# Patient Record
Sex: FEMALE | Race: BLACK OR AFRICAN AMERICAN | NOT HISPANIC OR LATINO | URBAN - METROPOLITAN AREA
[De-identification: names, ages, dates, MRNs, and addresses within clinical notes are randomized per-mention and may not be internally consistent; named-entity substitution may affect disease eponyms.]

---

## 2017-09-08 ENCOUNTER — EMERGENCY (EMERGENCY)
Facility: HOSPITAL | Age: 22
LOS: 1 days | Discharge: HOME | End: 2017-09-08
Attending: EMERGENCY MEDICINE | Admitting: EMERGENCY MEDICINE
Payer: OTHER MISCELLANEOUS

## 2017-09-08 VITALS
TEMPERATURE: 98 F | DIASTOLIC BLOOD PRESSURE: 80 MMHG | SYSTOLIC BLOOD PRESSURE: 130 MMHG | OXYGEN SATURATION: 99 % | RESPIRATION RATE: 16 BRPM | HEART RATE: 62 BPM

## 2017-09-08 PROBLEM — Z00.00 ENCOUNTER FOR PREVENTIVE HEALTH EXAMINATION: Status: ACTIVE | Noted: 2017-09-08

## 2017-09-08 PROCEDURE — 99284 EMERGENCY DEPT VISIT MOD MDM: CPT
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
## 2017-09-09 NOTE — ED PROVIDER NOTE - MUSCULOSKELETAL, MLM
Spine appears normal, range of motion is not limited, no muscle or joint tenderness. ttp at lower thoracic paraspinal not midline
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
## 2018-12-12 ENCOUNTER — RESULT REVIEW (OUTPATIENT)
Age: 23
End: 2018-12-12

## 2019-04-01 ENCOUNTER — EMERGENCY (EMERGENCY)
Facility: HOSPITAL | Age: 24
LOS: 1 days | Discharge: HOME | End: 2019-04-01
Admitting: EMERGENCY MEDICINE
Payer: COMMERCIAL

## 2019-04-01 VITALS
HEART RATE: 85 BPM | TEMPERATURE: 98 F | OXYGEN SATURATION: 98 % | RESPIRATION RATE: 18 BRPM | DIASTOLIC BLOOD PRESSURE: 85 MMHG | SYSTOLIC BLOOD PRESSURE: 149 MMHG

## 2019-04-01 DIAGNOSIS — Z90.5 ACQUIRED ABSENCE OF KIDNEY: Chronic | ICD-10-CM

## 2019-04-01 PROCEDURE — 99283 EMERGENCY DEPT VISIT LOW MDM: CPT

## 2019-04-01 RX ORDER — METHOCARBAMOL 500 MG/1
2 TABLET, FILM COATED ORAL
Qty: 18 | Refills: 0 | OUTPATIENT
Start: 2019-04-01 | End: 2019-04-03

## 2019-04-01 RX ORDER — ACETAMINOPHEN 500 MG
975 TABLET ORAL ONCE
Qty: 0 | Refills: 0 | Status: DISCONTINUED | OUTPATIENT
Start: 2019-04-01 | End: 2019-04-05

## 2019-04-01 NOTE — ED PROVIDER NOTE - NSFOLLOWUPINSTRUCTIONS_ED_ALL_ED_FT
Take tylenol 650mg every 6-8 hours as needed for pain.  take methocarbamol 1000mg every 8 hours as needed.  avoid any strenuous activity;   heat packs to affected area;   Drink plenty of fluids; rest;   Follow up with your pmd in 2 days.   return to ED for any worsening symptoms.

## 2019-04-01 NOTE — ED PROVIDER NOTE - PHYSICAL EXAMINATION
MSK: back: paraspinal muscle tenderness in thoracic and lumbar region, no midline tenderness, sensation intact, muscle strength 5/5 in Upper and lower extremities  NVI
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
## 2019-04-11 ENCOUNTER — EMERGENCY (EMERGENCY)
Facility: HOSPITAL | Age: 24
LOS: 1 days | Discharge: HOME | End: 2019-04-11
Attending: EMERGENCY MEDICINE | Admitting: EMERGENCY MEDICINE
Payer: COMMERCIAL

## 2019-04-11 VITALS
OXYGEN SATURATION: 99 % | DIASTOLIC BLOOD PRESSURE: 81 MMHG | RESPIRATION RATE: 18 BRPM | TEMPERATURE: 98 F | HEART RATE: 158 BPM | SYSTOLIC BLOOD PRESSURE: 116 MMHG

## 2019-04-11 VITALS — HEART RATE: 78 BPM

## 2019-04-11 DIAGNOSIS — Z90.5 ACQUIRED ABSENCE OF KIDNEY: Chronic | ICD-10-CM

## 2019-04-11 PROBLEM — Z78.9 OTHER SPECIFIED HEALTH STATUS: Chronic | Status: ACTIVE | Noted: 2019-04-01

## 2019-04-11 PROCEDURE — 72110 X-RAY EXAM L-2 SPINE 4/>VWS: CPT | Mod: 26

## 2019-04-11 PROCEDURE — 71046 X-RAY EXAM CHEST 2 VIEWS: CPT | Mod: 26

## 2019-04-11 PROCEDURE — 99283 EMERGENCY DEPT VISIT LOW MDM: CPT

## 2019-04-11 RX ORDER — LIDOCAINE 4 G/100G
1 CREAM TOPICAL ONCE
Qty: 0 | Refills: 0 | Status: COMPLETED | OUTPATIENT
Start: 2019-04-11 | End: 2019-04-11

## 2019-04-11 RX ORDER — OXYCODONE HYDROCHLORIDE 5 MG/1
5 TABLET ORAL ONCE
Qty: 0 | Refills: 0 | Status: DISCONTINUED | OUTPATIENT
Start: 2019-04-11 | End: 2019-04-11

## 2019-04-11 RX ORDER — ACETAMINOPHEN 500 MG
650 TABLET ORAL ONCE
Qty: 0 | Refills: 0 | Status: COMPLETED | OUTPATIENT
Start: 2019-04-11 | End: 2019-04-11

## 2019-04-11 RX ORDER — CYCLOBENZAPRINE HYDROCHLORIDE 10 MG/1
5 TABLET, FILM COATED ORAL ONCE
Qty: 0 | Refills: 0 | Status: DISCONTINUED | OUTPATIENT
Start: 2019-04-11 | End: 2019-04-11

## 2019-04-11 RX ORDER — CYCLOBENZAPRINE HYDROCHLORIDE 10 MG/1
10 TABLET, FILM COATED ORAL ONCE
Qty: 0 | Refills: 0 | Status: COMPLETED | OUTPATIENT
Start: 2019-04-11 | End: 2019-04-11

## 2019-04-11 RX ADMIN — Medication 650 MILLIGRAM(S): at 09:26

## 2019-04-11 RX ADMIN — Medication 650 MILLIGRAM(S): at 11:19

## 2019-04-11 RX ADMIN — OXYCODONE HYDROCHLORIDE 5 MILLIGRAM(S): 5 TABLET ORAL at 11:35

## 2019-04-11 RX ADMIN — LIDOCAINE 1 PATCH: 4 CREAM TOPICAL at 09:26

## 2019-04-11 RX ADMIN — CYCLOBENZAPRINE HYDROCHLORIDE 10 MILLIGRAM(S): 10 TABLET, FILM COATED ORAL at 09:26

## 2019-04-11 NOTE — ED PROVIDER NOTE - CHPI ED SYMPTOMS NEG
No nausea, no vomiting no bowel/bladder incontinence, no numbness/weakness in legs, no chest pain, no headache, no abdominal pain, no trouble breathing, no diarrhea

## 2019-04-11 NOTE — ED PROVIDER NOTE - MUSCULOSKELETAL, MLM
+FROM c-spine, no spinal tenderness. No midline back tenderness, no paraspinal tenderness. Neurologically intact. Strength and sensation intact. No pronator drift.

## 2019-04-11 NOTE — ED PROVIDER NOTE - PROGRESS NOTE DETAILS
Núñez: patient states pain slightly removed, discussed with patient xray results and need for possible MRI outpatient, agrees, sent meds to pharmacy, return precautions provided
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
## 2019-04-11 NOTE — ED PROVIDER NOTE - NSFOLLOWUPINSTRUCTIONS_ED_ALL_ED_FT
You were seen for back pain. At this time it does not appear you have a fracture on your xrays. Please follow up with the spine specialists provided. Return to the ED if you have new or worsening pain, weakness, numbness, difficulty urinating, lose control of your bowels, or have other new symptoms. Take the medications as provided
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
## 2019-04-11 NOTE — ED PROVIDER NOTE - NSFOLLOWUPCLINICS_GEN_ALL_ED_FT
[facility]  Ortho/Spine  [address] [place] [address]  Phone: [phone]  Fax:   Follow Up Time: 1-3 Days    [facility]  Orthopedic Surgery  [address], 3rd & 4th [place] [address]  Phone: [phone]  Fax:   Follow Up Time: 1-3 Days

## 2020-03-01 ENCOUNTER — EMERGENCY (EMERGENCY)
Facility: HOSPITAL | Age: 25
LOS: 1 days | Discharge: HOME | End: 2020-03-01
Attending: EMERGENCY MEDICINE | Admitting: EMERGENCY MEDICINE
Payer: COMMERCIAL

## 2020-03-01 VITALS
OXYGEN SATURATION: 100 % | SYSTOLIC BLOOD PRESSURE: 130 MMHG | RESPIRATION RATE: 18 BRPM | TEMPERATURE: 99 F | DIASTOLIC BLOOD PRESSURE: 84 MMHG | HEART RATE: 77 BPM

## 2020-03-01 VITALS
DIASTOLIC BLOOD PRESSURE: 85 MMHG | RESPIRATION RATE: 16 BRPM | HEART RATE: 91 BPM | TEMPERATURE: 99 F | SYSTOLIC BLOOD PRESSURE: 131 MMHG | OXYGEN SATURATION: 99 %

## 2020-03-01 DIAGNOSIS — Z90.5 ACQUIRED ABSENCE OF KIDNEY: Chronic | ICD-10-CM

## 2020-03-01 LAB
ALBUMIN SERPL ELPH-MCNC: 4.2 G/DL — SIGNIFICANT CHANGE UP (ref 3.3–5)
ALP SERPL-CCNC: 47 U/L — SIGNIFICANT CHANGE UP (ref 40–120)
ALT FLD-CCNC: 20 U/L — SIGNIFICANT CHANGE UP (ref 4–33)
ANION GAP SERPL CALC-SCNC: 13 MMO/L — SIGNIFICANT CHANGE UP (ref 7–14)
ANISOCYTOSIS BLD QL: SLIGHT — SIGNIFICANT CHANGE UP
AST SERPL-CCNC: 17 U/L — SIGNIFICANT CHANGE UP (ref 4–32)
BASE EXCESS BLDV CALC-SCNC: 2.1 MMOL/L — SIGNIFICANT CHANGE UP
BASOPHILS # BLD AUTO: 0.03 K/UL — SIGNIFICANT CHANGE UP (ref 0–0.2)
BASOPHILS NFR BLD AUTO: 0.5 % — SIGNIFICANT CHANGE UP (ref 0–2)
BASOPHILS NFR SPEC: 0 % — SIGNIFICANT CHANGE UP (ref 0–2)
BILIRUB SERPL-MCNC: 0.3 MG/DL — SIGNIFICANT CHANGE UP (ref 0.2–1.2)
BLASTS # FLD: 0 % — SIGNIFICANT CHANGE UP (ref 0–0)
BLOOD GAS VENOUS - CREATININE: 1.11 MG/DL — SIGNIFICANT CHANGE UP (ref 0.5–1.3)
BLOOD GAS VENOUS - FIO2: 21 — SIGNIFICANT CHANGE UP
BUN SERPL-MCNC: 8 MG/DL — SIGNIFICANT CHANGE UP (ref 7–23)
CALCIUM SERPL-MCNC: 9.1 MG/DL — SIGNIFICANT CHANGE UP (ref 8.4–10.5)
CHLORIDE BLDV-SCNC: 105 MMOL/L — SIGNIFICANT CHANGE UP (ref 96–108)
CHLORIDE SERPL-SCNC: 100 MMOL/L — SIGNIFICANT CHANGE UP (ref 98–107)
CO2 SERPL-SCNC: 24 MMOL/L — SIGNIFICANT CHANGE UP (ref 22–31)
CREAT SERPL-MCNC: 1.07 MG/DL — SIGNIFICANT CHANGE UP (ref 0.5–1.3)
EOSINOPHIL # BLD AUTO: 0.18 K/UL — SIGNIFICANT CHANGE UP (ref 0–0.5)
EOSINOPHIL NFR BLD AUTO: 3.3 % — SIGNIFICANT CHANGE UP (ref 0–6)
EOSINOPHIL NFR FLD: 0 % — SIGNIFICANT CHANGE UP (ref 0–6)
GAS PNL BLDV: 135 MMOL/L — LOW (ref 136–146)
GLUCOSE BLDV-MCNC: 78 MG/DL — SIGNIFICANT CHANGE UP (ref 70–99)
GLUCOSE SERPL-MCNC: 83 MG/DL — SIGNIFICANT CHANGE UP (ref 70–99)
HCO3 BLDV-SCNC: 24 MMOL/L — SIGNIFICANT CHANGE UP (ref 20–27)
HCT VFR BLD CALC: 41.2 % — SIGNIFICANT CHANGE UP (ref 34.5–45)
HCT VFR BLDV CALC: 41.9 % — SIGNIFICANT CHANGE UP (ref 34.5–45)
HGB BLD-MCNC: 13.4 G/DL — SIGNIFICANT CHANGE UP (ref 11.5–15.5)
HGB BLDV-MCNC: 13.6 G/DL — SIGNIFICANT CHANGE UP (ref 11.5–15.5)
IMM GRANULOCYTES NFR BLD AUTO: 0.2 % — SIGNIFICANT CHANGE UP (ref 0–1.5)
LACTATE BLDV-MCNC: 1.9 MMOL/L — SIGNIFICANT CHANGE UP (ref 0.5–2)
LYMPHOCYTES # BLD AUTO: 1.06 K/UL — SIGNIFICANT CHANGE UP (ref 1–3.3)
LYMPHOCYTES # BLD AUTO: 19.2 % — SIGNIFICANT CHANGE UP (ref 13–44)
LYMPHOCYTES NFR SPEC AUTO: 23.4 % — SIGNIFICANT CHANGE UP (ref 13–44)
MCHC RBC-ENTMCNC: 28.5 PG — SIGNIFICANT CHANGE UP (ref 27–34)
MCHC RBC-ENTMCNC: 32.5 % — SIGNIFICANT CHANGE UP (ref 32–36)
MCV RBC AUTO: 87.7 FL — SIGNIFICANT CHANGE UP (ref 80–100)
METAMYELOCYTES # FLD: 0 % — SIGNIFICANT CHANGE UP (ref 0–1)
MONOCYTES # BLD AUTO: 1.56 K/UL — HIGH (ref 0–0.9)
MONOCYTES NFR BLD AUTO: 28.3 % — HIGH (ref 2–14)
MONOCYTES NFR BLD: 27 % — HIGH (ref 2–9)
MYELOCYTES NFR BLD: 0 % — SIGNIFICANT CHANGE UP (ref 0–0)
NEUTROPHIL AB SER-ACNC: 42.4 % — LOW (ref 43–77)
NEUTROPHILS # BLD AUTO: 2.68 K/UL — SIGNIFICANT CHANGE UP (ref 1.8–7.4)
NEUTROPHILS NFR BLD AUTO: 48.5 % — SIGNIFICANT CHANGE UP (ref 43–77)
NEUTS BAND # BLD: 5.4 % — SIGNIFICANT CHANGE UP (ref 0–6)
NRBC # FLD: 0 K/UL — SIGNIFICANT CHANGE UP (ref 0–0)
OTHER - HEMATOLOGY %: 0 — SIGNIFICANT CHANGE UP
PCO2 BLDV: 54 MMHG — HIGH (ref 41–51)
PH BLDV: 7.33 PH — SIGNIFICANT CHANGE UP (ref 7.32–7.43)
PLATELET # BLD AUTO: 217 K/UL — SIGNIFICANT CHANGE UP (ref 150–400)
PLATELET COUNT - ESTIMATE: NORMAL — SIGNIFICANT CHANGE UP
PMV BLD: 11.1 FL — SIGNIFICANT CHANGE UP (ref 7–13)
PO2 BLDV: < 24 MMHG — LOW (ref 35–40)
POTASSIUM BLDV-SCNC: 3.5 MMOL/L — SIGNIFICANT CHANGE UP (ref 3.4–4.5)
POTASSIUM SERPL-MCNC: 4 MMOL/L — SIGNIFICANT CHANGE UP (ref 3.5–5.3)
POTASSIUM SERPL-SCNC: 4 MMOL/L — SIGNIFICANT CHANGE UP (ref 3.5–5.3)
PROMYELOCYTES # FLD: 0 % — SIGNIFICANT CHANGE UP (ref 0–0)
PROT SERPL-MCNC: 7.3 G/DL — SIGNIFICANT CHANGE UP (ref 6–8.3)
RBC # BLD: 4.7 M/UL — SIGNIFICANT CHANGE UP (ref 3.8–5.2)
RBC # FLD: 13.1 % — SIGNIFICANT CHANGE UP (ref 10.3–14.5)
SAO2 % BLDV: 33.6 % — LOW (ref 60–85)
SODIUM SERPL-SCNC: 137 MMOL/L — SIGNIFICANT CHANGE UP (ref 135–145)
VARIANT LYMPHS # BLD: 1.8 % — SIGNIFICANT CHANGE UP
WBC # BLD: 5.52 K/UL — SIGNIFICANT CHANGE UP (ref 3.8–10.5)
WBC # FLD AUTO: 5.52 K/UL — SIGNIFICANT CHANGE UP (ref 3.8–10.5)

## 2020-03-01 PROCEDURE — 99283 EMERGENCY DEPT VISIT LOW MDM: CPT

## 2020-03-01 PROCEDURE — 71046 X-RAY EXAM CHEST 2 VIEWS: CPT | Mod: 26

## 2020-03-01 RX ORDER — SODIUM CHLORIDE 9 MG/ML
1000 INJECTION INTRAMUSCULAR; INTRAVENOUS; SUBCUTANEOUS ONCE
Refills: 0 | Status: COMPLETED | OUTPATIENT
Start: 2020-03-01 | End: 2020-03-01

## 2020-03-01 RX ORDER — ACETAMINOPHEN 500 MG
650 TABLET ORAL ONCE
Refills: 0 | Status: COMPLETED | OUTPATIENT
Start: 2020-03-01 | End: 2020-03-01

## 2020-03-01 RX ADMIN — SODIUM CHLORIDE 1000 MILLILITER(S): 9 INJECTION INTRAMUSCULAR; INTRAVENOUS; SUBCUTANEOUS at 10:58

## 2020-03-01 RX ADMIN — Medication 650 MILLIGRAM(S): at 10:57

## 2020-03-01 RX ADMIN — SODIUM CHLORIDE 1000 MILLILITER(S): 9 INJECTION INTRAMUSCULAR; INTRAVENOUS; SUBCUTANEOUS at 12:22

## 2020-03-01 NOTE — ED PROVIDER NOTE - OBJECTIVE STATEMENT
26 y/o female presents to the ED c/o fever, chills, and body ache x3 days and cough since last night. She states Friday morning she woke up with a fever (T=103.5), headache and body aches. States the headache resolved but the fever and body aches persisted. Reports before her cough she had a few episodes of vomiting, but not currently. Reports recent travel to [place]. Denies getting a flu shot this season. Denies smoking and admits to drinking in moderation.  States she donated a kidney to her father.

## 2020-03-01 NOTE — ED PROVIDER NOTE - CLINICAL SUMMARY MEDICAL DECISION MAKING FREE TEXT BOX
26 y/o female with most likely viral syndrome, possible UTI, fever, cough and body aches. Will rule out pneumonia but most likely viral. Will check basic labs, chest x-ray and reassess. 24 y/o female with most likely viral syndrome, possible UTI, fever, cough and body aches. Will rule out pneumonia but most likely viral. Will check basic labs, chest x-ray and reassess.    Pt with URI symptoms, flu like symptoms, and cough, lungs cta, heart wnl, possibly viral syndrome, but concern for pneumonia, dehydration or electrolyte disturbance,  will check labs, IVF, check xray, and reassess.

## 2020-03-01 NOTE — ED PROVIDER NOTE - NSFOLLOWUPINSTRUCTIONS_ED_ALL_ED_FT
You were seen in the emergency department for cough and body aches. Please follow up with your primary doctor in the next 2-3 days. Take ibuprofen 400 milligrams every 4 hours as needed for pain and or fever 100.4 or higher; if fever persists between doses take tylenol 650 milligrams every 6 hours as needed. Return to the emergency department immediately if you experience difficulty breathing or any other concerning symptoms.

## 2020-03-01 NOTE — ED PROVIDER NOTE - ATTENDING CONTRIBUTION TO CARE
I performed a face to face evaluation of this patient and performed a full history and physical examination on the patient.  I agree with the resident's history, physical examination, and plan of the patient.  Pt with URI symptoms, flu like symptoms, and cough, lungs cta, heart wnl, possibly viral syndrome, but concern for pneumonia, dehydration or electrolyte disturbance,  will check labs, IVF, check xray, and reassess.

## 2020-03-01 NOTE — ED PROVIDER NOTE - ENMT, MLM
Airway patent, MMM. Throat has no vesicles, no oropharyngeal exudates and uvula is midline. Neck is supple.

## 2022-11-30 ENCOUNTER — EMERGENCY (EMERGENCY)
Facility: HOSPITAL | Age: 27
LOS: 1 days | Discharge: HOME | End: 2022-11-30
Admitting: EMERGENCY MEDICINE

## 2022-11-30 VITALS
SYSTOLIC BLOOD PRESSURE: 133 MMHG | HEART RATE: 105 BPM | DIASTOLIC BLOOD PRESSURE: 85 MMHG | RESPIRATION RATE: 18 BRPM | TEMPERATURE: 100 F | OXYGEN SATURATION: 92 %

## 2022-11-30 VITALS
TEMPERATURE: 101 F | DIASTOLIC BLOOD PRESSURE: 87 MMHG | OXYGEN SATURATION: 97 % | HEART RATE: 105 BPM | RESPIRATION RATE: 16 BRPM | SYSTOLIC BLOOD PRESSURE: 140 MMHG

## 2022-11-30 DIAGNOSIS — Z90.5 ACQUIRED ABSENCE OF KIDNEY: Chronic | ICD-10-CM

## 2022-11-30 LAB
ALBUMIN SERPL ELPH-MCNC: 4.4 G/DL — SIGNIFICANT CHANGE UP (ref 3.3–5)
ALP SERPL-CCNC: 56 U/L — SIGNIFICANT CHANGE UP (ref 40–120)
ALT FLD-CCNC: 12 U/L — SIGNIFICANT CHANGE UP (ref 4–33)
ANION GAP SERPL CALC-SCNC: 13 MMOL/L — SIGNIFICANT CHANGE UP (ref 7–14)
AST SERPL-CCNC: 14 U/L — SIGNIFICANT CHANGE UP (ref 4–32)
BASOPHILS # BLD AUTO: 0.04 K/UL — SIGNIFICANT CHANGE UP (ref 0–0.2)
BASOPHILS NFR BLD AUTO: 0.6 % — SIGNIFICANT CHANGE UP (ref 0–2)
BILIRUB SERPL-MCNC: 0.6 MG/DL — SIGNIFICANT CHANGE UP (ref 0.2–1.2)
BUN SERPL-MCNC: 10 MG/DL — SIGNIFICANT CHANGE UP (ref 7–23)
CALCIUM SERPL-MCNC: 9.4 MG/DL — SIGNIFICANT CHANGE UP (ref 8.4–10.5)
CHLORIDE SERPL-SCNC: 104 MMOL/L — SIGNIFICANT CHANGE UP (ref 98–107)
CO2 SERPL-SCNC: 22 MMOL/L — SIGNIFICANT CHANGE UP (ref 22–31)
CREAT SERPL-MCNC: 1.09 MG/DL — SIGNIFICANT CHANGE UP (ref 0.5–1.3)
EGFR: 71 ML/MIN/1.73M2 — SIGNIFICANT CHANGE UP
EOSINOPHIL # BLD AUTO: 0.26 K/UL — SIGNIFICANT CHANGE UP (ref 0–0.5)
EOSINOPHIL NFR BLD AUTO: 3.7 % — SIGNIFICANT CHANGE UP (ref 0–6)
FLUAV AG NPH QL: DETECTED
FLUBV AG NPH QL: SIGNIFICANT CHANGE UP
GLUCOSE SERPL-MCNC: 88 MG/DL — SIGNIFICANT CHANGE UP (ref 70–99)
HCT VFR BLD CALC: 40.7 % — SIGNIFICANT CHANGE UP (ref 34.5–45)
HGB BLD-MCNC: 13.5 G/DL — SIGNIFICANT CHANGE UP (ref 11.5–15.5)
IANC: 4.1 K/UL — SIGNIFICANT CHANGE UP (ref 1.8–7.4)
IMM GRANULOCYTES NFR BLD AUTO: 0.4 % — SIGNIFICANT CHANGE UP (ref 0–0.9)
LYMPHOCYTES # BLD AUTO: 1.53 K/UL — SIGNIFICANT CHANGE UP (ref 1–3.3)
LYMPHOCYTES # BLD AUTO: 22 % — SIGNIFICANT CHANGE UP (ref 13–44)
MCHC RBC-ENTMCNC: 28.6 PG — SIGNIFICANT CHANGE UP (ref 27–34)
MCHC RBC-ENTMCNC: 33.2 GM/DL — SIGNIFICANT CHANGE UP (ref 32–36)
MCV RBC AUTO: 86.2 FL — SIGNIFICANT CHANGE UP (ref 80–100)
MONOCYTES # BLD AUTO: 0.99 K/UL — HIGH (ref 0–0.9)
MONOCYTES NFR BLD AUTO: 14.2 % — HIGH (ref 2–14)
NEUTROPHILS # BLD AUTO: 4.1 K/UL — SIGNIFICANT CHANGE UP (ref 1.8–7.4)
NEUTROPHILS NFR BLD AUTO: 59.1 % — SIGNIFICANT CHANGE UP (ref 43–77)
NRBC # BLD: 0 /100 WBCS — SIGNIFICANT CHANGE UP (ref 0–0)
NRBC # FLD: 0 K/UL — SIGNIFICANT CHANGE UP (ref 0–0)
PLATELET # BLD AUTO: 263 K/UL — SIGNIFICANT CHANGE UP (ref 150–400)
POTASSIUM SERPL-MCNC: 3.9 MMOL/L — SIGNIFICANT CHANGE UP (ref 3.5–5.3)
POTASSIUM SERPL-SCNC: 3.9 MMOL/L — SIGNIFICANT CHANGE UP (ref 3.5–5.3)
PROT SERPL-MCNC: 7.7 G/DL — SIGNIFICANT CHANGE UP (ref 6–8.3)
RBC # BLD: 4.72 M/UL — SIGNIFICANT CHANGE UP (ref 3.8–5.2)
RBC # FLD: 12.7 % — SIGNIFICANT CHANGE UP (ref 10.3–14.5)
RSV RNA NPH QL NAA+NON-PROBE: SIGNIFICANT CHANGE UP
SARS-COV-2 RNA SPEC QL NAA+PROBE: SIGNIFICANT CHANGE UP
SODIUM SERPL-SCNC: 139 MMOL/L — SIGNIFICANT CHANGE UP (ref 135–145)
WBC # BLD: 6.95 K/UL — SIGNIFICANT CHANGE UP (ref 3.8–10.5)
WBC # FLD AUTO: 6.95 K/UL — SIGNIFICANT CHANGE UP (ref 3.8–10.5)

## 2022-11-30 PROCEDURE — 99284 EMERGENCY DEPT VISIT MOD MDM: CPT

## 2022-11-30 PROCEDURE — 71046 X-RAY EXAM CHEST 2 VIEWS: CPT | Mod: 26

## 2022-11-30 RX ORDER — SODIUM CHLORIDE 9 MG/ML
1000 INJECTION INTRAMUSCULAR; INTRAVENOUS; SUBCUTANEOUS ONCE
Refills: 0 | Status: COMPLETED | OUTPATIENT
Start: 2022-11-30 | End: 2022-11-30

## 2022-11-30 RX ADMIN — Medication 125 MILLIGRAM(S): at 20:47

## 2022-11-30 RX ADMIN — SODIUM CHLORIDE 1000 MILLILITER(S): 9 INJECTION INTRAMUSCULAR; INTRAVENOUS; SUBCUTANEOUS at 20:47

## 2022-11-30 NOTE — ED PROVIDER NOTE - CLINICAL SUMMARY MEDICAL DECISION MAKING FREE TEXT BOX
27-year-old female no past medical history presents the ED complaining of chest tightness, shortness of breath, posttussive vomiting x2 weeks.  likely bronchitis, viral syndrome. r/o pneumonia- cxr, labs, flu/covid swab

## 2022-11-30 NOTE — ED PROVIDER NOTE - PATIENT PORTAL LINK FT
You can access the FollowMyHealth Patient Portal offered by [facility] by registering at the following website: [email or web address]. By joining [employer]’s FollowMyHealth portal, you will also be able to view your health information using other applications (apps) compatible with our system.

## 2022-11-30 NOTE — ED PROVIDER NOTE - CONSTITUTIONAL, MLM
[facility] Certification Request    July 30, 2018      Patient: Olesya Stephen  MR Number: [id number]  YOB: 1948  Date of Visit: 7/30/2018      Dear Radha Swann CNP:    Thank you for this referral.   We are seeing Olesya Stephen for Physical Therapy of neck and back with R leg pain.    Medicare and/or Medicaid requires physician review and approval of the treatment plan. Please review the plan of care and verify that you agree with the therapy plan of care by co-signing this note.      Plan of Care  Authorization / Certification Start Date: 07/30/18  Authorization / Certification End Date: 10/28/18  Authorization / Certification Number of Visits: 12   Communication with: Referral Source  Patient Related Instruction: Nature of Condition;Treatment plan and rationale;Self Care instruction;Basis of treatment;Body mechanics;Posture;Precautions;Next steps;Expected outcome  Times per Week: 1  Number of Weeks: 12  Number of Visits: 12  Discharge Planning: when indicated  Precautions / Restrictions : none  Therapeutic Exercise: ROM;Stretching;Strengthening  Neuromuscular Reeducation: posture;TNE;core;other  Neuromuscular Re-education: neurodynamics  Manual Therapy: soft tissue mobilization;joint mobilization;muscle energy  Functional Training (ADL's): self care;ADL's    Goals:  Pt. will demonstrate/verbalize independence in self-management of condition in : 12 weeks  Pt. will be able to walk : 30 minutes;with less pain;with less difficulty;for household mobility;for community mobility;for exercise/recreation;in 12 weeks  Patient will ascend / descend: stairs;with less pain;with less difficulty;in 12 weeks  Patient will transfer: sit/stand;floor/stand;for car;for toileting;for in/out of bed;for in/out of chair;supine/sit;with less pain;with less difficulty;in 12 weeks  Pt will: be able to perform knitting and quilting with minimal pain and difficullty in 12 weeks for improved QOL.      If  normal... you have any questions or concerns, please don't hesitate to call.    Sincerely,      Abril Celaya, PT, DPT, OCT      Physician recommendation:     _x__ Follow therapist's recommendation        ___ Modify therapy      *Physician co-signature indicates they certify the need for these services furnished within this plan and while under their care.      [facility]   Initial Evaluation    Patient Name: Olesya Stephen  Date of evaluation: 7/30/2018  Visit number: 1/12  Referring Provider: Radha Swann CNP  Referring Diagnosis:   Cervical cord compression with myelopathy (H) [G95.20]  - Primary         Visit Diagnosis:     ICD-10-CM    1. Acute neck pain M54.2    2. Generalized muscle weakness M62.81    3. Chronic low back pain with sciatica M54.40     G89.29    4. Poor balance R26.89    5. Neurologic gait dysfunction R26.9        Assessment:        Olesya Stephen is a 69 y.o. female who presents to therapy today with chief complaints of neck and back pain with UE and LE weakness and decreased sensation. Onset date of sx was ~14 years ago for neck and back pain and hand and foot tingling.  Pt reported h/o decreased energy, use of UE/LE and difficulty with mobility worsening over the past 3-4 years. Pt recently switched her primary MD this past year and was found on a MRI to have cervical and lumbar narrowing. Pt had cervical surgery with laminectomies and laminoplasties on the R side on June 12th, 2018 and pt also had a lumbar injection this past fall/winter.  Functional impairments include difficulty and pain with gardening, walking, quilting, typing/writing, needing a railing for stairs, housework and getting back up from a squatting position.  Pt demo's signs and sx consistent with decreased neck ROM and strength with recent surgery, R UE deltoid and ER weakness, LE proximal and core/trunk weakness, decreased gait and decreased balance.     Pt. is appropriate for skilled PT intervention as  outlined in the Plan of Care (POC).  Pt. is a good candidate for skilled PT services to improve pain levels and function.    Goals:  Pt. will demonstrate/verbalize independence in self-management of condition in : 12 weeks  Pt. will be able to walk : 30 minutes;with less pain;with less difficulty;for household mobility;for community mobility;for exercise/recreation;in 12 weeks  Patient will ascend / descend: stairs;with less pain;with less difficulty;in 12 weeks  Patient will transfer: sit/stand;floor/stand;for car;for toileting;for in/out of bed;for in/out of chair;supine/sit;with less pain;with less difficulty;in 12 weeks  Pt will: be able to perform knitting and quilting with minimal pain and difficullty in 12 weeks for improved QOL.    Patient's expectations/goals are realistic.    Barriers to Learning or Achieving Goals:  No Barriers.       Plan / Patient Instructions:      Plan for next visit: Assess response to daily walking activity.  Attempt shoulder flexion, ER, hip flexion, TA and bridges.  Assess laterality hands and feet and graphesthesia.    Plan of Care:   Authorization / Certification Start Date: 07/30/18  Authorization / Certification End Date: 10/28/18  Authorization / Certification Number of Visits: 12   Communication with: Referral Source  Patient Related Instruction: Nature of Condition;Treatment plan and rationale;Self Care instruction;Basis of treatment;Body mechanics;Posture;Precautions;Next steps;Expected outcome  Times per Week: 1  Number of Weeks: 12  Number of Visits: 12  Discharge Planning: when indicated  Precautions / Restrictions : none  Therapeutic Exercise: ROM;Stretching;Strengthening  Neuromuscular Reeducation: posture;TNE;core;other  Neuromuscular Re-education: neurodynamics  Manual Therapy: soft tissue mobilization;joint mobilization;muscle energy  Functional Training (ADL's): self care;ADL's    Treatment techniques, plan of care, and goals were discussed with the patient.  The  "patient agrees to the plan as outlined.  The plan of care is dynamic and will be modified on an ongoing basis.       Subjective:       Social information:   Occupation:retired   Work Status:NA    History of Present Illness:  Pt reports having cervical surgery on 18. She had an open door laminoplasty C7 open on right, with laminectomies C3-6 and foraminotomies C4-C7.    Pt reports she had many years of neck and back pain with hand and foot tingling - maybe the past 14 years.  Pt reports over the last 3-4 years pt has felt less energy for gardening, harder time walking and a hard time feeling her quilting needles.     Pt reports she did recently change MD providers and had MRIs performed for lumbar and cervical. Pt had a lumbar injection and then her cervical MRI showed 5 levels of narrowing around the spinal cord so surgery was performed.  Lumbar injection was last fall/winter - this was beneficial but pt can still have sx with tightness through R low back and hip versus L.    Pt reports she has pulling through the back of her neck since surgery but this has improved since surgery. Since surgery pt does feel like her energy and stamina are improving and she doesn't \"dread\" walking as much as before.     Limited with gardening, walking, quilting, typing/writing and gripping, needs railing with stairs, squatting with getting back up and housework.     PMHx significant for high cholesterol. 5 years ago pt fell up choir steps and she broke her R wrist and hit her R shoulder and has had increased R shoulder pain.  Pt sprained her ankles many times when she was younger and she has knee pain as well.    Pain Ratin  Pain rating at best: 0 when flat in bed  Pain rating at worst: 5  Pain description: pain bothers more in R hip but can have \"pulling\" pain into B neck     Patient reports benefit from:  rest         Objective:      Patient Outcome Measures :    Neck Disability Score in %: 30   Scores range from 0-100%, " where a score of 0% represents minimal pain and maximal function. The minmal clinically important difference is a score reduction of 10%.    Precautions/Restrictions: None  Involved side: Bilateral  Posture Observation:      General sitting posture is  fair.  Gait: Amb with B trendelenberg R > L and significant R trunk lean    Palpation: B upper trap and lev scap with thoracic and cervical paraspinals mild tenderness    ROM: Cervical flexion 55   with pulling and tightness, ext 35   with tightness, R rotation 35   with tightness and pulling, L rotation 65   with mild pulling on R, R SB 9 cm ear to acromion with tightness, L SB 10 cm ear to acromion with R sided pulling    B UE ROM WFL without pain      Strength: R shoulder flexion 3+/5, L shoulder flexion 4/5, R shoulder ER 4/5 with mild pain, L shoulder ER 4+/5  R  40 lbs force average  L  45 lbs force average    R hip flexor 3/5, L hip flexor 4/5, B quads, DF and EHL 5/5    Sensation: Intact to light touch B UE and LE - but reportedly feels different than normal per pt report    Special tests:   APTA balance test 8 reps in 30 seconds    Two Minute Walk Test  Total meters walked: 384 feet  AD used? none  RPE: 8  Age gender norm: 509 ft.    TWO- MINUTE WALK TEST (2mwt) MEANS    Women MEAN DISTANCE (FEET) METERS       + cm   18-54 600.4 183 m 0.192  cm   55-59 578.7 176 m 38.776 cm   60-64 545.9 166 m 32.936 cm   65-69 509.2 155 m 20.416 cm   70-74 478.7 145 m 95.9576 cm   75-79 462.3 140 m 90.904 cm   80-85 440.6 136 m 29.488 cm   R.W Michael et al (2014)  Conversion Rossana Grimm PT, CLT-KRYSTAL        Treatment Today      TREATMENT MINUTES COMMENTS   Evaluation 45 Cervical/Lumbar UE/LE   Self-care/ Home management     Manual therapy     Neuromuscular Re-education     Therapeutic Activity     Therapeutic Exercises 10 Discussed eval findings and POC. Discussed importance of big picture strategies for decreaseing chronic pain including walking program  for aerobic exercise. Pt to try journaling.   Gait training     Modality__________________                Total 55    Blank areas are intentional and mean the treatment did not include these items.        PT Evaluation Code: (Please list factors)  Patient History/Comorbidities: cervical and lumbar involvement  Examination: Neck, trunk B UE and LE  Clinical Presentation: Stable  Clinical Decision Making: low    Patient History/  Comorbidities Examination  (body structures and functions, activity limitations, and/or participation restrictions) Clinical Presentation Clinical Decision Making (Complexity)   No documented Comorbidities or personal factors 1-2 Elements Stable and/or uncomplicated Low   1-2 documented comorbidities or personal factor 3 Elements Evolving clinical presentation with changing characteristics Moderate   3-4 documented comorbidities or personal factors 4 or more Unstable and unpredictable High       Abril Celaya PT, DPT, OCS, CLT  7/30/2018  11:34 AM     Well appearing, awake, alert, oriented to person, place, time/situation and in no apparent distress.

## 2022-11-30 NOTE — ED ADULT NURSE NOTE - OBJECTIVE STATEMENT
Pt received to intake 10C. Pt a&Ox4, amb at baseline. Denies pmh, kidney donor L kidney intact. Pt c/o chest tightness associated with productive cough for 2x weeks, abd pain w/ nausea vomiting, fevers, chills occurring for 2x weeks. Pt states she saw UC 2x weeks ago and was prescribed an inhaler with partial relief, but symptoms came back. Resp even unlabored, abd soft nontender, pedal pulses 2+ bilaterally. Denies diarrhea, dizziness, numbness/tingling to hands/feet. 20G to L AC, medicated, labs sent.

## 2022-11-30 NOTE — ED PROVIDER NOTE - OBJECTIVE STATEMENT
27-year-old female no past medical history presents the ED complaining of chest tightness, shortness of breath, posttussive vomiting x2 weeks.  Patient states was seen in urgent care 2 weeks ago and was given albuterol inhaler which has not helped with her cough.  Endorses chills, no fever.  Denies any abdominal pain, diarrhea, sick contacts, recent travel, leg swelling.  Patient cannot take NSAIDs due to having 1 kidney (is a kidney donor).

## 2022-11-30 NOTE — ED PROVIDER NOTE - NSFOLLOWUPINSTRUCTIONS_ED_ALL_ED_FT
Take prednisone 40 mg once a day for 4 days.  Take benzonatate 200 mg twice a day as needed for cough.  Drink plenty of fluids, rest, avoid any strenuous activity.  Follow-up with your PCP in 2 days.  If symptoms persist you may need to see a pulmonologist.  Return to ER for any worsening fever, shortness of breath, chest pain or any other worsening or concerning symptoms.

## 2022-12-02 ENCOUNTER — EMERGENCY (EMERGENCY)
Facility: HOSPITAL | Age: 27
LOS: 1 days | Discharge: HOME | End: 2022-12-02
Attending: STUDENT IN AN ORGANIZED HEALTH CARE EDUCATION/TRAINING PROGRAM | Admitting: STUDENT IN AN ORGANIZED HEALTH CARE EDUCATION/TRAINING PROGRAM

## 2022-12-02 VITALS
TEMPERATURE: 98 F | RESPIRATION RATE: 16 BRPM | DIASTOLIC BLOOD PRESSURE: 79 MMHG | OXYGEN SATURATION: 99 % | SYSTOLIC BLOOD PRESSURE: 136 MMHG | HEART RATE: 91 BPM

## 2022-12-02 DIAGNOSIS — Z90.5 ACQUIRED ABSENCE OF KIDNEY: Chronic | ICD-10-CM

## 2022-12-02 PROCEDURE — 99284 EMERGENCY DEPT VISIT MOD MDM: CPT

## 2022-12-02 RX ORDER — ALBUTEROL 90 UG/1
1 AEROSOL, METERED ORAL ONCE
Refills: 0 | Status: COMPLETED | OUTPATIENT
Start: 2022-12-02 | End: 2022-12-02

## 2022-12-02 RX ORDER — IPRATROPIUM/ALBUTEROL SULFATE 18-103MCG
3 AEROSOL WITH ADAPTER (GRAM) INHALATION ONCE
Refills: 0 | Status: COMPLETED | OUTPATIENT
Start: 2022-12-02 | End: 2022-12-02

## 2022-12-02 RX ADMIN — Medication 3 MILLILITER(S): at 22:24

## 2022-12-02 RX ADMIN — ALBUTEROL 1 PUFF(S): 90 AEROSOL, METERED ORAL at 22:24

## 2022-12-02 NOTE — ED PROVIDER NOTE - PATIENT PORTAL LINK FT
You can access the FollowMyHealth Patient Portal offered by [facility] by registering at the following website: [email or web address]. By joining [email or web address] [email or web address] portal, you will also be able to view your health information using other applications (apps) compatible with our system.

## 2022-12-02 NOTE — ED ADULT TRIAGE NOTE - PAIN RATING/NUMBER SCALE (0-10): ACTIVITY
5 Chonodrocutaneous Helical Advancement Flap Text: The defect edges were debeveled with a #15 scalpel blade.  Given the location of the defect and the proximity to free margins a chondrocutaneous helical advancement flap was deemed most appropriate.  Using a sterile surgical marker, the appropriate advancement flap was drawn incorporating the defect and placing the expected incisions within the relaxed skin tension lines where possible.    The area thus outlined was incised deep to adipose tissue with a #15 scalpel blade.  The skin margins were undermined to an appropriate distance in all directions utilizing iris scissors.

## 2022-12-02 NOTE — ED ADULT NURSE NOTE - OBJECTIVE STATEMENT
pT a&OX4, ambulatory in NAD, resp equal and unlabored. PT seen in ED on Wednesday for flu like symptoms. PT returns to ED c/o n/v/d, fever/chills and cough. Pt denies; SOB, CP, syncope, urinary symptoms. Medicated as per MD order.

## 2022-12-02 NOTE — ED ADULT TRIAGE NOTE - CHIEF COMPLAINT QUOTE
Pt c/o cough, reports was seen here 2 days ago for similar complaint however reports cough is worse.

## 2022-12-02 NOTE — ED PROVIDER NOTE - CLINICAL SUMMARY MEDICAL DECISION MAKING FREE TEXT BOX
27F recently dx with FluA p/w ongoing cough. Currently ~day 6 of symptoms. Fevers resolved though with persistent cough that keeps up at night - mild expiratory wheeze likely in setting of viral bronchitis - will give duoneb here and send home with albuterol. CXR and labs reviewed from 2 days prior, no repeat labs/imaging required.

## 2022-12-02 NOTE — ED PROVIDER NOTE - NSFOLLOWUPINSTRUCTIONS_ED_ALL_ED_FT
Follow up with your primary care doctor    Continue benzoate as needed as previously prescribed. If no improvement, you can take over the counter cough medications like Robitussin DM or Mucinex to help with cough.    Use albuterol every 2-4 hours as needed for severe coughing.     Return to ED with any new / worsening symptoms or anything else concerning to you

## 2022-12-02 NOTE — ED PROVIDER NOTE - OBJECTIVE STATEMENT
27F no PMH p/w persistent cough. Seen in ED 2 days ago for same, found FluA+ with clear CXR. Labs otherwise unremarkable. Reports severe coughing fits keeping up at night and associated post-tussive nausea but no vomiting. Drinking well but taking little food. Denies abd pain, n/v/d. No cp, sob, difficulty breathing. Prescribed prednisone and teslon perle at time of last dc without relief.

## 2024-03-10 ENCOUNTER — INPATIENT (INPATIENT)
Facility: HOSPITAL | Age: 29
LOS: 3 days | Discharge: HOME | DRG: 194 | End: 2024-03-14
Attending: INTERNAL MEDICINE | Admitting: INTERNAL MEDICINE
Payer: COMMERCIAL

## 2024-03-10 VITALS
DIASTOLIC BLOOD PRESSURE: 81 MMHG | WEIGHT: 188.05 LBS | HEART RATE: 141 BPM | HEIGHT: 65 IN | SYSTOLIC BLOOD PRESSURE: 129 MMHG | TEMPERATURE: 102 F | OXYGEN SATURATION: 92 % | RESPIRATION RATE: 30 BRPM

## 2024-03-10 DIAGNOSIS — R06.02 SHORTNESS OF BREATH: ICD-10-CM

## 2024-03-10 DIAGNOSIS — Z90.5 ACQUIRED ABSENCE OF KIDNEY: Chronic | ICD-10-CM

## 2024-03-10 LAB
ALBUMIN SERPL ELPH-MCNC: 4.6 G/DL — SIGNIFICANT CHANGE UP (ref 3.3–5)
ALP SERPL-CCNC: 61 U/L — SIGNIFICANT CHANGE UP (ref 40–120)
ALT FLD-CCNC: 19 U/L — SIGNIFICANT CHANGE UP (ref 10–45)
ANION GAP SERPL CALC-SCNC: 15 MMOL/L — SIGNIFICANT CHANGE UP (ref 5–17)
AST SERPL-CCNC: 20 U/L — SIGNIFICANT CHANGE UP (ref 10–40)
BASOPHILS # BLD AUTO: 0.03 K/UL — SIGNIFICANT CHANGE UP (ref 0–0.2)
BASOPHILS NFR BLD AUTO: 0.3 % — SIGNIFICANT CHANGE UP (ref 0–2)
BILIRUB SERPL-MCNC: 0.4 MG/DL — SIGNIFICANT CHANGE UP (ref 0.2–1.2)
BUN SERPL-MCNC: 14 MG/DL — SIGNIFICANT CHANGE UP (ref 7–23)
CALCIUM SERPL-MCNC: 9.4 MG/DL — SIGNIFICANT CHANGE UP (ref 8.4–10.5)
CHLORIDE SERPL-SCNC: 101 MMOL/L — SIGNIFICANT CHANGE UP (ref 96–108)
CO2 SERPL-SCNC: 21 MMOL/L — LOW (ref 22–31)
CREAT SERPL-MCNC: 1.17 MG/DL — SIGNIFICANT CHANGE UP (ref 0.5–1.3)
EGFR: 65 ML/MIN/1.73M2 — SIGNIFICANT CHANGE UP
EOSINOPHIL # BLD AUTO: 0.01 K/UL — SIGNIFICANT CHANGE UP (ref 0–0.5)
EOSINOPHIL NFR BLD AUTO: 0.1 % — SIGNIFICANT CHANGE UP (ref 0–6)
FLUAV AG NPH QL: DETECTED
FLUBV AG NPH QL: SIGNIFICANT CHANGE UP
GLUCOSE SERPL-MCNC: 108 MG/DL — HIGH (ref 70–99)
HCT VFR BLD CALC: 42.5 % — SIGNIFICANT CHANGE UP (ref 34.5–45)
HGB BLD-MCNC: 14 G/DL — SIGNIFICANT CHANGE UP (ref 11.5–15.5)
IMM GRANULOCYTES NFR BLD AUTO: 0.2 % — SIGNIFICANT CHANGE UP (ref 0–0.9)
LYMPHOCYTES # BLD AUTO: 0.59 K/UL — LOW (ref 1–3.3)
LYMPHOCYTES # BLD AUTO: 6.5 % — LOW (ref 13–44)
MCHC RBC-ENTMCNC: 28.1 PG — SIGNIFICANT CHANGE UP (ref 27–34)
MCHC RBC-ENTMCNC: 32.9 GM/DL — SIGNIFICANT CHANGE UP (ref 32–36)
MCV RBC AUTO: 85.3 FL — SIGNIFICANT CHANGE UP (ref 80–100)
MONOCYTES # BLD AUTO: 0.85 K/UL — SIGNIFICANT CHANGE UP (ref 0–0.9)
MONOCYTES NFR BLD AUTO: 9.3 % — SIGNIFICANT CHANGE UP (ref 2–14)
NEUTROPHILS # BLD AUTO: 7.64 K/UL — HIGH (ref 1.8–7.4)
NEUTROPHILS NFR BLD AUTO: 83.6 % — HIGH (ref 43–77)
NRBC # BLD: 0 /100 WBCS — SIGNIFICANT CHANGE UP (ref 0–0)
PLATELET # BLD AUTO: 216 K/UL — SIGNIFICANT CHANGE UP (ref 150–400)
POTASSIUM SERPL-MCNC: 4 MMOL/L — SIGNIFICANT CHANGE UP (ref 3.5–5.3)
POTASSIUM SERPL-SCNC: 4 MMOL/L — SIGNIFICANT CHANGE UP (ref 3.5–5.3)
PROT SERPL-MCNC: 8.4 G/DL — HIGH (ref 6–8.3)
RBC # BLD: 4.98 M/UL — SIGNIFICANT CHANGE UP (ref 3.8–5.2)
RBC # FLD: 13.4 % — SIGNIFICANT CHANGE UP (ref 10.3–14.5)
RSV RNA NPH QL NAA+NON-PROBE: SIGNIFICANT CHANGE UP
SARS-COV-2 RNA SPEC QL NAA+PROBE: SIGNIFICANT CHANGE UP
SODIUM SERPL-SCNC: 137 MMOL/L — SIGNIFICANT CHANGE UP (ref 135–145)
WBC # BLD: 9.14 K/UL — SIGNIFICANT CHANGE UP (ref 3.8–10.5)
WBC # FLD AUTO: 9.14 K/UL — SIGNIFICANT CHANGE UP (ref 3.8–10.5)

## 2024-03-10 RX ORDER — IPRATROPIUM/ALBUTEROL SULFATE 18-103MCG
3 AEROSOL WITH ADAPTER (GRAM) INHALATION EVERY 6 HOURS
Refills: 0 | Status: DISCONTINUED | OUTPATIENT
Start: 2024-03-10 | End: 2024-03-14

## 2024-03-10 RX ORDER — DEXAMETHASONE 0.5 MG/5ML
6 ELIXIR ORAL ONCE
Refills: 0 | Status: COMPLETED | OUTPATIENT
Start: 2024-03-10 | End: 2024-03-10

## 2024-03-10 RX ORDER — MAGNESIUM SULFATE 500 MG/ML
2 VIAL (ML) INJECTION ONCE
Refills: 0 | Status: COMPLETED | OUTPATIENT
Start: 2024-03-10 | End: 2024-03-10

## 2024-03-10 RX ORDER — SODIUM CHLORIDE 9 MG/ML
1000 INJECTION INTRAMUSCULAR; INTRAVENOUS; SUBCUTANEOUS ONCE
Refills: 0 | Status: COMPLETED | OUTPATIENT
Start: 2024-03-10 | End: 2024-03-10

## 2024-03-10 RX ORDER — ACETAMINOPHEN 500 MG
975 TABLET ORAL ONCE
Refills: 0 | Status: COMPLETED | OUTPATIENT
Start: 2024-03-10 | End: 2024-03-10

## 2024-03-10 RX ORDER — ALBUTEROL 90 UG/1
2 AEROSOL, METERED ORAL ONCE
Refills: 0 | Status: COMPLETED | OUTPATIENT
Start: 2024-03-10 | End: 2024-03-10

## 2024-03-10 RX ORDER — INFLUENZA VIRUS VACCINE 15; 15; 15; 15 UG/.5ML; UG/.5ML; UG/.5ML; UG/.5ML
0.5 SUSPENSION INTRAMUSCULAR ONCE
Refills: 0 | Status: DISCONTINUED | OUTPATIENT
Start: 2024-03-10 | End: 2024-03-14

## 2024-03-10 RX ORDER — ENOXAPARIN SODIUM 100 MG/ML
40 INJECTION SUBCUTANEOUS EVERY 24 HOURS
Refills: 0 | Status: DISCONTINUED | OUTPATIENT
Start: 2024-03-10 | End: 2024-03-14

## 2024-03-10 RX ORDER — IPRATROPIUM/ALBUTEROL SULFATE 18-103MCG
3 AEROSOL WITH ADAPTER (GRAM) INHALATION
Refills: 0 | Status: COMPLETED | OUTPATIENT
Start: 2024-03-10 | End: 2024-03-10

## 2024-03-10 RX ADMIN — Medication 150 GRAM(S): at 15:21

## 2024-03-10 RX ADMIN — Medication 3 MILLILITER(S): at 15:34

## 2024-03-10 RX ADMIN — Medication 975 MILLIGRAM(S): at 16:42

## 2024-03-10 RX ADMIN — Medication 3 MILLILITER(S): at 15:15

## 2024-03-10 RX ADMIN — SODIUM CHLORIDE 1000 MILLILITER(S): 9 INJECTION INTRAMUSCULAR; INTRAVENOUS; SUBCUTANEOUS at 15:37

## 2024-03-10 RX ADMIN — ALBUTEROL 2 PUFF(S): 90 AEROSOL, METERED ORAL at 19:40

## 2024-03-10 RX ADMIN — Medication 6 MILLIGRAM(S): at 15:38

## 2024-03-10 RX ADMIN — Medication 40 MILLIGRAM(S): at 23:05

## 2024-03-10 NOTE — ED ADULT NURSE NOTE - NSFALLUNIVINTERV_ED_ALL_ED
Bed/Stretcher in lowest position, wheels locked, appropriate side rails in place/Call bell, personal items and telephone in reach/Instruct patient to call for assistance before getting out of bed/chair/stretcher/Non-slip footwear applied when patient is off stretcher/[place] to call system/Physically safe environment - no spills, clutter or unnecessary equipment/Purposeful proactive rounding/Room/bathroom lighting operational, light cord in reach

## 2024-03-10 NOTE — ED ADULT NURSE NOTE - OBJECTIVE STATEMENT
29y female, AAox4, PMH asthma, reports shortness of breath x 2 days, worse since 5 am, took home inhalator and neb w/ no relief, no chest pain, abdominal pain, 20g right ac, placed in gown, side rails up for safety, bed in lowest position, call bell within reach, patient and family educated on plan of care, comfort and safety provided.

## 2024-03-10 NOTE — ED PROVIDER NOTE - OBJECTIVE STATEMENT
29 female, history of asthma not requiring intubations, kidney donation elective, presenting for shortness of breath since last night.  Has been coughing for the past few days.  Using inhalers at home without relief, last use 15 minutes prior to arrival.  No chest pain, abdominal pain. 29 female, history of asthma not requiring intubations, kidney donation elective, presenting for shortness of breath and cough since last night.  Using inhalers at home without relief, last use 15 minutes prior to arrival.  No chest pain, abdominal pain, sick contacts.  Has never been admitted or intubated for her asthma.

## 2024-03-10 NOTE — H&P ADULT - HISTORY OF PRESENT ILLNESS
29 female, history of asthma not requiring intubations, kidney donation elective, presenting for shortness of breath and cough since last night.  Using inhalers at home without relief, last use 15 minutes prior to arrival.  No chest pain, abdominal pain, sick contacts.  Has never been admitted or intubated for her asthma.

## 2024-03-10 NOTE — ED PROVIDER NOTE - PHYSICAL EXAMINATION
CONSTITUTIONAL: Uncomfortable appearing, awake, alert, and in mild apparent distress.  HEAD: normocephalic, atraumatic  ENT: oral mucosa moist  CARDIAC: Tachycardic; no murmurs, rubs, or gallops  RESPIRATORY: shallow breaths; diffuse wheezes throughout;   GASTROINTESTINAL: abdomen nondistended, soft, nontender, no guarding, rebound tenderness  MSK: Spine appears normal, range of motion is not limited, no muscle or joint tenderness  NEURO: Alert and oriented, no focal deficits, no motor or sensory deficits.  SKIN: Skin normal color for race, warm, dry and intact. No evidence of rash.  PSYCH: appropriate mood and affect

## 2024-03-10 NOTE — PATIENT PROFILE ADULT - FALL HARM RISK - UNIVERSAL INTERVENTIONS
Bed in lowest position, wheels locked, appropriate side rails in place/Call bell, personal items and telephone in reach/Instruct patient to call for assistance before getting out of bed or chair/Non-slip footwear when patient is out of bed/[place] to call system/Physically safe environment - no spills, clutter or unnecessary equipment/Purposeful Proactive Rounding/Room/bathroom lighting operational, light cord in reach

## 2024-03-10 NOTE — ED PROVIDER NOTE - ATTENDING CONTRIBUTION TO CARE
Attending Emergency Medicine Physician- Kendell Frederick MD, FACEP: In this physician's medical judgement based on clinical history and physical exam the patient's signs and symptoms lead to differential diagnoses which includes but is not limited to: uri, asthma exacerbation, pneumonia   Historical features, symptoms, and clinical exam not consistent with: sepsis  Labs were ordered and independently reviewed by me.  Imaging was ordered and reviewed by me.  Independent interpretation by myself: no pneumonia or pneumothorax to my eyes  Appropriate medications for the patient's presenting complaints were ordered, and effects were reassessed. iv magnesium for asthma exacerbation  History assisted by family  Will follow up on labs, therapeutics, imaging, reassess and disposition as clinically indicated.  *The above represents an initial assessment/impression. Please refer to my progress notes below for potential changes in patient clinical course*  Escalation to admission/observation was considered.   Kendell Frederick MD FACEP note of transfer at the usual time of sign out: Receiving team will follow up on labs, analgesia, any clinical imaging, reassess and disposition as clinically indicated.  Details of patient and plan conveyed to receiving physician and conveyed back for understanding.  There were no questions at this time about the patient's status, disposition, and plan. Patient's care to be taken over by receiving physician at this time, all decisions regarding the progression of care will be made at their discretion.

## 2024-03-10 NOTE — ED PROVIDER NOTE - NS ED MD DISPO ADMITTING SERVICE
[address] [birth year] 2022       RE: Cari Chappell      To Whom It May Concern,    This is to certify that Cari Chappell is under the care of [facility] may return to work on light duty with the following appopriate accomodations:    No heavy lifting (nothing over 10 lbs)  No straining  Ability to sit for 10 minutes every hour     No other physical activity which could potentially cause premature labor. Please feel free to contact our office if you have any questions or concerns. Thank you for your assistance in this matter.       Sincerely,  Gricelda Arellano, [address] [place] [address]  [phone]
MED

## 2024-03-10 NOTE — ED ADULT NURSE NOTE - NS ED NURSE LEVEL OF CONSCIOUSNESS ORIENTATION
Patient Quality Outreach      Summary:    Patient is due/failing the following:   Annual wellness, date due: now    Type of outreach:    Sent [employer] message.    Questions for provider review:    None                                                                                   **Start Working phrase here:**       Patient has the following on his problem list/HM: None                                                Tanisha ALMARAZ RN           
Oriented - self; Oriented - place; Oriented - time

## 2024-03-10 NOTE — H&P ADULT - ASSESSMENT
The patient is a 29y Female complaining of shortness of breath.    Br Asthma Exa:    Likely from Influenza  Duoneb  IV Solumedrol  Supp O2 PRN  Pul eval called    Influenza:    Tamiflu  Droplet precautions    Dw family

## 2024-03-10 NOTE — ED PROVIDER NOTE - PROGRESS NOTE DETAILS
Espinoza PGY1: re-evaluated patient.  less wheezing after breathing treatments, but still mild wheezes BL.  patient still feels chest tightness. Espinoza PGY1: re-evaluated patient.  less wheezing after breathing treatments, but still mild wheezes BL.  patient still feels chest tightness.  Discussed plan for admission for further treatment and evaluation.  patient understands and agrees.

## 2024-03-10 NOTE — ED PROVIDER NOTE - CLINICAL SUMMARY MEDICAL DECISION MAKING FREE TEXT BOX
29 female, history of asthma without prior intubations for, kidney donation elective, presents for shortness of breath since last night cough for the past few days.  Patient tachycardic at home 131, temp of 100.  Lungs with diffuse wheezing throughout, shallow respirations.  Concern for asthma exacerbation secondary to infectious process.  Will give DuoNebs, magnesium, steroids, check basic labs, flu COVID, chest x-ray fluids.

## 2024-03-10 NOTE — ED ADULT NURSE NOTE - PRO INTERPRETER NEED 2

## 2024-03-11 DIAGNOSIS — J45.901 UNSPECIFIED ASTHMA WITH (ACUTE) EXACERBATION: ICD-10-CM

## 2024-03-11 DIAGNOSIS — J10.1 INFLUENZA DUE TO OTHER IDENTIFIED INFLUENZA VIRUS WITH OTHER RESPIRATORY MANIFESTATIONS: ICD-10-CM

## 2024-03-11 LAB
BASE EXCESS BLDV CALC-SCNC: -2.6 MMOL/L — LOW (ref -2–3)
CA-I SERPL-SCNC: 1.25 MMOL/L — SIGNIFICANT CHANGE UP (ref 1.15–1.33)
CHLORIDE BLDV-SCNC: 104 MMOL/L — SIGNIFICANT CHANGE UP (ref 96–108)
CO2 BLDV-SCNC: 24 MMOL/L — SIGNIFICANT CHANGE UP (ref 22–26)
GAS PNL BLDV: 135 MMOL/L — LOW (ref 136–145)
GAS PNL BLDV: SIGNIFICANT CHANGE UP
GLUCOSE BLDV-MCNC: 118 MG/DL — HIGH (ref 70–99)
HCO3 BLDV-SCNC: 23 MMOL/L — SIGNIFICANT CHANGE UP (ref 22–29)
HCT VFR BLDA CALC: 44 % — SIGNIFICANT CHANGE UP (ref 34.5–46.5)
HGB BLD CALC-MCNC: 14.6 G/DL — SIGNIFICANT CHANGE UP (ref 11.7–16.1)
HOROWITZ INDEX BLDV+IHG-RTO: 21 — SIGNIFICANT CHANGE UP
LACTATE BLDV-MCNC: 2.4 MMOL/L — HIGH (ref 0.5–2)
PCO2 BLDV: 43 MMHG — HIGH (ref 39–42)
PH BLDV: 7.34 — SIGNIFICANT CHANGE UP (ref 7.32–7.43)
PO2 BLDV: 24 MMHG — LOW (ref 25–45)
POTASSIUM BLDV-SCNC: 4.3 MMOL/L — SIGNIFICANT CHANGE UP (ref 3.5–5.1)
SAO2 % BLDV: 38 % — LOW (ref 67–88)

## 2024-03-11 RX ORDER — IPRATROPIUM/ALBUTEROL SULFATE 18-103MCG
3 AEROSOL WITH ADAPTER (GRAM) INHALATION ONCE
Refills: 0 | Status: COMPLETED | OUTPATIENT
Start: 2024-03-11 | End: 2024-03-11

## 2024-03-11 RX ORDER — SODIUM CHLORIDE 9 MG/ML
500 INJECTION INTRAMUSCULAR; INTRAVENOUS; SUBCUTANEOUS ONCE
Refills: 0 | Status: COMPLETED | OUTPATIENT
Start: 2024-03-11 | End: 2024-03-11

## 2024-03-11 RX ADMIN — Medication 20 MILLIGRAM(S): at 15:19

## 2024-03-11 RX ADMIN — Medication 3 MILLILITER(S): at 23:25

## 2024-03-11 RX ADMIN — Medication 40 MILLIGRAM(S): at 05:13

## 2024-03-11 RX ADMIN — Medication 100 MILLIGRAM(S): at 03:26

## 2024-03-11 RX ADMIN — Medication 3 MILLILITER(S): at 11:44

## 2024-03-11 RX ADMIN — Medication 20 MILLIGRAM(S): at 22:03

## 2024-03-11 RX ADMIN — Medication 3 MILLILITER(S): at 00:24

## 2024-03-11 RX ADMIN — SODIUM CHLORIDE 500 MILLILITER(S): 9 INJECTION INTRAMUSCULAR; INTRAVENOUS; SUBCUTANEOUS at 21:00

## 2024-03-11 RX ADMIN — Medication 75 MILLIGRAM(S): at 17:32

## 2024-03-11 RX ADMIN — Medication 3 MILLILITER(S): at 17:32

## 2024-03-11 RX ADMIN — Medication 3 MILLILITER(S): at 05:12

## 2024-03-11 RX ADMIN — Medication 75 MILLIGRAM(S): at 05:13

## 2024-03-11 RX ADMIN — Medication 3 MILLILITER(S): at 03:26

## 2024-03-11 NOTE — CONSULT NOTE ADULT - PROBLEM SELECTOR RECOMMENDATION 9
2nd to Influenza A infection  -Decrease steroids to Solumedrol 20 mg IVP q8h, will continue to evaluate further taper  -Continue Duoneb q6h  -Check peak flow  -Keep sats >90% with O2 PRN. Wean O2 as tolerated. 2nd to Influenza A infection  -Decrease steroids to Solumedrol 20 mg IVP q8h, will continue to evaluate further taper  -Continue Duoneb q6h  -Check peak flow  -Keep sats >90% with O2 PRN. Wean O2 as tolerated  -Suggest outpatient pulmonary f/u on discharge, eventual outpatient PFTs.

## 2024-03-11 NOTE — CONSULT NOTE ADULT - NS ATTEND AMEND GEN_ALL_CORE FT
pt seen and examined and discussed with pt:  she smokes and does weed:  adv strongly tyo stop smoking and doing weed:  cont steroids as she is wheezing a ot and cont Tamiflu for influenza:  in addition do vbg

## 2024-03-11 NOTE — CONSULT NOTE ADULT - SUBJECTIVE AND OBJECTIVE BOX
Pulmonary Consult   03-11-24 @ 09:57    Patient is a 29y old  Female who presents with a chief complaint of The patient is a 29y Female complaining of shortness of breath. (10 Mar 2024 19:06)    HPI: 28 y/o F with PMH of asthma, elective kidney donation. Presents with SOB, cough unrelieved with use of home inhaler. Found to be + for Influenza A on admission. CXR grossly clear. Pulmonary called to consult for asthma exacerbation.    ?FOLLOWING PRESENT  [ ] Hx of PE/DVT, [ ] Hx COPD, [ ] Hx of Asthma, [ ] Hx of Hospitalization, [ ]  Hx of BiPAP/CPAP use, [ ] Hx of RAVEN    Allergies  ibuprofen (Other)    PAST MEDICAL & SURGICAL HISTORY:  Asthma    FAMILY HISTORY:    Social History: [  ] TOBACCO                  [  ] ETOH                                 [  ] IVDA/DRUGS    REVIEW OF SYSTEMS    General:	    Skin/Breast:  	  Ophthalmologic:  	  ENMT:	    Respiratory and Thorax:  	  Cardiovascular:	    Gastrointestinal:	    Genitourinary:	    Musculoskeletal:	    Neurological:	    Psychiatric:	    Hematology/Lymphatics:	    Endocrine:	    Allergic/Immunologic:	    MEDICATIONS  (STANDING):  albuterol/ipratropium for Nebulization 3 milliLiter(s) Nebulizer every 6 hours  enoxaparin Injectable 40 milliGRAM(s) SubCutaneous every 24 hours  influenza   Vaccine 0.5 milliLiter(s) IntraMuscular once  methylPREDNISolone sodium succinate Injectable 40 milliGRAM(s) IV Push every 8 hours  oseltamivir 75 milliGRAM(s) Oral two times a day    Vital Signs Last 24 Hrs  T(C): 37.2 (11 Mar 2024 05:18), Max: 39.1 (10 Mar 2024 14:32)  T(F): 98.9 (11 Mar 2024 05:18), Max: 102.3 (10 Mar 2024 14:32)  HR: 90 (11 Mar 2024 05:18) (90 - 141)  BP: 132/86 (11 Mar 2024 05:18) (108/66 - 132/86)  BP(mean): --  RR: 18 (11 Mar 2024 05:18) (18 - 30)  SpO2: 95% (11 Mar 2024 05:18) (92% - 99%)    Parameters below as of 11 Mar 2024 05:18  Patient On (Oxygen Delivery Method): nasal cannula  O2 Flow (L/min): 2  Orthostatic VS    Physical Exam:   GENERAL: NAD, well-groomed, well-developed  HEENT: SABA/   Atraumatic, Normocephalic  ENMT: No tonsillar erythema, exudates, or enlargement; Moist mucous membranes, Good dentition, No lesions  NECK: Supple, No JVD, Normal thyroid  CHEST/LUNG: Clear to auscultation bilaterally; No rales, rhonchi, wheezing, or rubs  CVS: Regular rate and rhythm; No murmurs, rubs, or gallops  GI: : Soft, Nontender, Nondistended; Bowel sounds present  NERVOUS SYSTEM:  Alert & Oriented X3, Good concentration; Motor Strength 5/5 B/L upper and lower extremities; DTRs 2+ intact and symmetric  EXTREMITIES:  2+ Peripheral Pulses, No clubbing, cyanosis, or edema  LYMPH: No lymphadenopathy noted  SKIN: No rashes or lesions  ENDOCRINOLOGY: No Thyromegaly  PSYCH: Appropriate    Labs:                              14.0   9.14  )-----------( 216      ( 10 Mar 2024 15:29 )             42.5     03-10    137  |  101  |  14  ----------------------------<  108<H>  4.0   |  21<L>  |  1.17    Ca    9.4      10 Mar 2024 15:29    TPro  8.4<H>  /  Alb  4.6  /  TBili  0.4  /  DBili  x   /  AST  20  /  ALT  19  /  AlkPhos  61  03-10    LIVER FUNCTIONS - ( 10 Mar 2024 15:29 )  Alb: 4.6 g/dL / Pro: 8.4 g/dL / ALK PHOS: 61 U/L / ALT: 19 U/L / AST: 20 U/L / GGT: x           Urinalysis Basic - ( 10 Mar 2024 15:29 )    Color: x / Appearance: x / SG: x / pH: x  Gluc: 108 mg/dL / Ketone: x  / Bili: x / Urobili: x   Blood: x / Protein: x / Nitrite: x   Leuk Esterase: x / RBC: x / WBC x   Sq Epi: x / Non Sq Epi: x / Bacteria: x    Studies  Chest X-RAY  ctc< from: Xray Chest 2 Views PA/Lat (03.10.24 @ 16:29) >    ACC: [id number] EXAM:  XR CHEST PA LAT 2V   ORDERED BY:  RITESH VERDUGO     PROCEDURE DATE:  03/10/2024          INTERPRETATION:  CLINICAL INFORMATION: Shortness of breath    TECHNIQUE: Frontal and lateral view(s) of the chest.    COMPARISON: No comparison available.    FINDINGS:  The cardiac silhouette is normal in size.  No focal consolidation, pleural effusion or pneumothorax.  Visualized osseous structures are unremarkable for the patient's age.    IMPRESSION:  Clear lungs.    --- End of Report ---      < end of copied text >                   Pulmonary Consult   03-11-24 @ 09:57    Patient is a 29y old  Female who presents with a chief complaint of The patient is a 29y Female complaining of shortness of breath. (10 Mar 2024 19:06)    HPI: 30 y/o F with PMH of asthma, elective kidney donation. Presents with SOB, cough unrelieved with use of home inhaler. Found to be + for Influenza A on admission. CXR grossly clear. Pulmonary called to consult for asthma exacerbation. Current smoker - pt reports socially. Denies established care with a pulmonologist for her asthma, only on PRN Albuterol. Denies prior hospitalizations or intubations. Reports SOB, worse with exertion & cough that is worse with deep breaths. Denies CP, pleuritic chest pain, fever, chills. O2 sats 97% on 2LNC.     ?FOLLOWING PRESENT  [   x] Hx of PE/DVT, [ x ] Hx COPD, [ yes ] Hx of Asthma, [ x ] Hx of Hospitalization, [ x ]  Hx of BiPAP/CPAP use, [ x ] Hx of RAVEN    Allergies  ibuprofen (Other)    PAST MEDICAL & SURGICAL HISTORY:  Asthma    FAMILY HISTORY: non contributory     Social History: [ current; pt reports socially ] TOBACCO                  [ x ] ETOH                                 [ x ] IVDA/DRUGS    REVIEW OF SYSTEMS    General:	 x    Skin/Breast: x  	  Ophthalmologic: x  	  ENMT:	x    Respiratory and Thorax: as above  	  Cardiovascular:	 x    Gastrointestinal:	x    Genitourinary:	x    Musculoskeletal:	 x    Neurological:	x     Psychiatric:	x    Hematology/Lymphatics:	 x    Endocrine:	 x    Allergic/Immunologic:	 x    MEDICATIONS  (STANDING):  albuterol/ipratropium for Nebulization 3 milliLiter(s) Nebulizer every 6 hours  enoxaparin Injectable 40 milliGRAM(s) SubCutaneous every 24 hours  influenza   Vaccine 0.5 milliLiter(s) IntraMuscular once  methylPREDNISolone sodium succinate Injectable 40 milliGRAM(s) IV Push every 8 hours  oseltamivir 75 milliGRAM(s) Oral two times a day    Vital Signs Last 24 Hrs  T(C): 37.2 (11 Mar 2024 05:18), Max: 39.1 (10 Mar 2024 14:32)  T(F): 98.9 (11 Mar 2024 05:18), Max: 102.3 (10 Mar 2024 14:32)  HR: 90 (11 Mar 2024 05:18) (90 - 141)  BP: 132/86 (11 Mar 2024 05:18) (108/66 - 132/86)  BP(mean): --  RR: 18 (11 Mar 2024 05:18) (18 - 30)  SpO2: 95% (11 Mar 2024 05:18) (92% - 99%)    Parameters below as of 11 Mar 2024 05:18  Patient On (Oxygen Delivery Method): nasal cannula  O2 Flow (L/min): 2  Orthostatic VS    Physical Exam:   GENERAL: NAD  HEENT: SABA  ENMT: No tonsillar erythema, exudates, or enlargement  NECK: Supple, No JVD  CHEST/LUNG: Decreased BS; few b/l exp wheeze   CVS: Regular rate and rhythm  GI: : Soft, Nontender, Nondistended  NERVOUS SYSTEM:  Alert & Oriented X3  EXTREMITIES:  2+ Peripheral Pulses, No clubbing, cyanosis, or edema  SKIN: No rashes or lesions  PSYCH: Appropriate    Labs:                              14.0   9.14  )-----------( 216      ( 10 Mar 2024 15:29 )             42.5     03-10    137  |  101  |  14  ----------------------------<  108<H>  4.0   |  21<L>  |  1.17    Ca    9.4      10 Mar 2024 15:29    TPro  8.4<H>  /  Alb  4.6  /  TBili  0.4  /  DBili  x   /  AST  20  /  ALT  19  /  AlkPhos  61  03-10    LIVER FUNCTIONS - ( 10 Mar 2024 15:29 )  Alb: 4.6 g/dL / Pro: 8.4 g/dL / ALK PHOS: 61 U/L / ALT: 19 U/L / AST: 20 U/L / GGT: x           Urinalysis Basic - ( 10 Mar 2024 15:29 )    Color: x / Appearance: x / SG: x / pH: x  Gluc: 108 mg/dL / Ketone: x  / Bili: x / Urobili: x   Blood: x / Protein: x / Nitrite: x   Leuk Esterase: x / RBC: x / WBC x   Sq Epi: x / Non Sq Epi: x / Bacteria: x    Studies  Chest X-RAY  ctc< from: Xray Chest 2 Views PA/Lat (03.10.24 @ 16:29) >    ACC: [id number] EXAM:  XR CHEST PA LAT 2V   ORDERED BY:  RITESH VERDUGO     PROCEDURE DATE:  03/10/2024      INTERPRETATION:  CLINICAL INFORMATION: Shortness of breath    TECHNIQUE: Frontal and lateral view(s) of the chest.    COMPARISON: No comparison available.    FINDINGS:  The cardiac silhouette is normal in size.  No focal consolidation, pleural effusion or pneumothorax.  Visualized osseous structures are unremarkable for the patient's age.    IMPRESSION:  Clear lungs.    --- End of Report ---      < end of copied text >     Pulmonary Consult   03-11-24 @ 09:57    Patient is a 29y old  Female who presents with a chief complaint of The patient is a 29y Female complaining of shortness of breath. (10 Mar 2024 19:06)    HPI: 28 y/o F with PMH of asthma, elective kidney donation. Presents with SOB, cough unrelieved with use of home inhaler. Found to be + for Influenza A on admission. CXR grossly clear. Pulmonary called to consult for asthma exacerbation. Current smoker - pt reports socially. Denies established care with a pulmonologist for her asthma, only on PRN Albuterol. Denies prior hospitalizations or intubations. Reports SOB, worse with exertion & cough that is worse with deep breaths. Denies CP, pleuritic chest pain, fever, chills. O2 sats 97% on 2LNC.     ?FOLLOWING PRESENT  [   x] Hx of PE/DVT, [ x ] Hx COPD, [ yes ] Hx of Asthma, [ x ] Hx of Hospitalization, [ x ]  Hx of BiPAP/CPAP use, [ x ] Hx of RAVEN    Allergies  ibuprofen (Other)    PAST MEDICAL & SURGICAL HISTORY:  Asthma    FAMILY HISTORY: non contributory     Social History: [ current; pt reports socially ] TOBACCO                  [ x ] ETOH                                 [ weed+ ] IVDA/DRUGS    REVIEW OF SYSTEMS    General:	 x    Skin/Breast: x  	  Ophthalmologic: x  	  ENMT:	x    Respiratory and Thorax: as above  	  Cardiovascular:	 x    Gastrointestinal:	x    Genitourinary:	x    Musculoskeletal:	 x    Neurological:	x     Psychiatric:	x    Hematology/Lymphatics:	 x    Endocrine:	 x    Allergic/Immunologic:	 x    MEDICATIONS  (STANDING):  albuterol/ipratropium for Nebulization 3 milliLiter(s) Nebulizer every 6 hours  enoxaparin Injectable 40 milliGRAM(s) SubCutaneous every 24 hours  influenza   Vaccine 0.5 milliLiter(s) IntraMuscular once  methylPREDNISolone sodium succinate Injectable 40 milliGRAM(s) IV Push every 8 hours  oseltamivir 75 milliGRAM(s) Oral two times a day    Vital Signs Last 24 Hrs  T(C): 37.2 (11 Mar 2024 05:18), Max: 39.1 (10 Mar 2024 14:32)  T(F): 98.9 (11 Mar 2024 05:18), Max: 102.3 (10 Mar 2024 14:32)  HR: 90 (11 Mar 2024 05:18) (90 - 141)  BP: 132/86 (11 Mar 2024 05:18) (108/66 - 132/86)  BP(mean): --  RR: 18 (11 Mar 2024 05:18) (18 - 30)  SpO2: 95% (11 Mar 2024 05:18) (92% - 99%)    Parameters below as of 11 Mar 2024 05:18  Patient On (Oxygen Delivery Method): nasal cannula  O2 Flow (L/min): 2  Orthostatic VS    Physical Exam:   GENERAL: NAD  HEENT: SABA  ENMT: No tonsillar erythema, exudates, or enlargement  NECK: Supple, No JVD  CHEST/LUNG: Decreased BS; few b/l exp wheeze   CVS: Regular rate and rhythm  GI: : Soft, Nontender, Nondistended  NERVOUS SYSTEM:  Alert & Oriented X3  EXTREMITIES:  2+ Peripheral Pulses, No clubbing, cyanosis, or edema  SKIN: No rashes or lesions  PSYCH: Appropriate    Labs:                              14.0   9.14  )-----------( 216      ( 10 Mar 2024 15:29 )             42.5     03-10    137  |  101  |  14  ----------------------------<  108<H>  4.0   |  21<L>  |  1.17    Ca    9.4      10 Mar 2024 15:29    TPro  8.4<H>  /  Alb  4.6  /  TBili  0.4  /  DBili  x   /  AST  20  /  ALT  19  /  AlkPhos  61  03-10    LIVER FUNCTIONS - ( 10 Mar 2024 15:29 )  Alb: 4.6 g/dL / Pro: 8.4 g/dL / ALK PHOS: 61 U/L / ALT: 19 U/L / AST: 20 U/L / GGT: x           Urinalysis Basic - ( 10 Mar 2024 15:29 )    Color: x / Appearance: x / SG: x / pH: x  Gluc: 108 mg/dL / Ketone: x  / Bili: x / Urobili: x   Blood: x / Protein: x / Nitrite: x   Leuk Esterase: x / RBC: x / WBC x   Sq Epi: x / Non Sq Epi: x / Bacteria: x    Studies  Chest X-RAY  ctc< from: Xray Chest 2 Views PA/Lat (03.10.24 @ 16:29) >    ACC: [id number] EXAM:  XR CHEST PA LAT 2V   ORDERED BY:  RITESH VERDUGO     PROCEDURE DATE:  03/10/2024      INTERPRETATION:  CLINICAL INFORMATION: Shortness of breath    TECHNIQUE: Frontal and lateral view(s) of the chest.    COMPARISON: No comparison available.    FINDINGS:  The cardiac silhouette is normal in size.  No focal consolidation, pleural effusion or pneumothorax.  Visualized osseous structures are unremarkable for the patient's age.    IMPRESSION:  Clear lungs.    --- End of Report ---      < end of copied text >

## 2024-03-11 NOTE — CONSULT NOTE ADULT - ASSESSMENT
28 y/o F with PMH of asthma, elective kidney donation. Presents with SOB, cough unrelieved with use of home inhaler. Found to be + for Influenza A on admission. CXR grossly clear. Pulmonary called to consult for asthma exacerbation.

## 2024-03-11 NOTE — PROGRESS NOTE ADULT - SUBJECTIVE AND OBJECTIVE BOX
Patient is a 29y old  Female who presents with a chief complaint of The patient is a 29y Female complaining of shortness of breath. (11 Mar 2024 09:55)      SUBJECTIVE / OVERNIGHT EVENTS:    Events noted.  CONSTITUTIONAL: No fever,  or fatigue  RESPIRATORY: On supp O2  CARDIOVASCULAR: No chest pain, palpitations, dizziness, or leg swelling  GASTROINTESTINAL: No abdominal or epigastric pain.       MEDICATIONS  (STANDING):  albuterol/ipratropium for Nebulization 3 milliLiter(s) Nebulizer every 6 hours  enoxaparin Injectable 40 milliGRAM(s) SubCutaneous every 24 hours  influenza   Vaccine 0.5 milliLiter(s) IntraMuscular once  methylPREDNISolone sodium succinate Injectable 20 milliGRAM(s) IV Push every 8 hours  oseltamivir 75 milliGRAM(s) Oral two times a day    MEDICATIONS  (PRN):        CAPILLARY BLOOD GLUCOSE        I&O's Summary      T(C): 36.7 (03-11-24 @ 12:08), Max: 39.1 (03-10-24 @ 14:32)  HR: 97 (03-11-24 @ 12:08) (90 - 141)  BP: 135/89 (03-11-24 @ 12:08) (108/66 - 135/89)  RR: 18 (03-11-24 @ 12:08) (18 - 30)  SpO2: 95% (03-11-24 @ 12:08) (92% - 99%)    PHYSICAL EXAM:    NECK: Supple, No JVD  CHEST/LUNG: Bl rhonchi  HEART: Regular rate and rhythm; No murmurs, rubs, or gallops  ABDOMEN: Soft, Nontender, Nondistended; Bowel sounds present  EXTREMITIES:   No edema  NEUROLOGY: AAO X 3      LABS:                        14.0   9.14  )-----------( 216      ( 10 Mar 2024 15:29 )             42.5     03-10    137  |  101  |  14  ----------------------------<  108<H>  4.0   |  21<L>  |  1.17    Ca    9.4      10 Mar 2024 15:29    TPro  8.4<H>  /  Alb  4.6  /  TBili  0.4  /  DBili  x   /  AST  20  /  ALT  19  /  AlkPhos  61  03-10          Urinalysis Basic - ( 10 Mar 2024 15:29 )    Color: x / Appearance: x / SG: x / pH: x  Gluc: 108 mg/dL / Ketone: x  / Bili: x / Urobili: x   Blood: x / Protein: x / Nitrite: x   Leuk Esterase: x / RBC: x / WBC x   Sq Epi: x / Non Sq Epi: x / Bacteria: x      CAPILLARY BLOOD GLUCOSE            RADIOLOGY & ADDITIONAL TESTS:    Imaging Personally Reviewed:    Consultant(s) Notes Reviewed:      Care Discussed with Consultants/Other Providers:    De Clark MD, CMD, FACP    [address] [place] [address]  Office Tel: [phone]  Cell: [phone]

## 2024-03-11 NOTE — CONSULT NOTE ADULT - CONSULT REQUESTED DATE/TIME
11-Mar-2024 09:56 SUBJECTIVE  Chief Complaint   Patient presents with   • Anxiety   • Depression   • ADHD   • Other     Mood disorder     Visit Vitals  /74   Pulse 76   Wt 62.5 kg (137 lb 10.8 oz)   BMI 21.56 kg/m²         Patient ID: Antony is a 30 year old male who is being seen today to follow-up on anxiety, depression, ADHD, and mood disorder.  He arrived for his appointment today on time.  He is neatly dressed and groomed and dressed appropriately for the weather.  He is here today with his girlfriend.    At his last appointment, Antony was started on Seroquel.  He states he took it for approximately 5 days and started feeling more depressed so stopped taking it.  Around that time he also found an old prescription for Adderall and started taking it.  He has stopped taking that now.  His depression is in good control now.  Today he rates his anxiety as 2-3 out of 10 on a scale of 1-10 with 10 being the worst.  He rates depression as 1-2 out of 10.  He denies suicidal and homicidal ideation.  He denies hallucinations.  He states he is sleeping anywhere from 6 to 10 hours a day.  His irritability is still a problem but has been improving.  We also reviewed his recent labs today.  His carbamazepine was in the bottom of the therapeutic window.  There were no concerns with his CBC or CMP.    Regarding medications, today Antony has agreed to increase his carbamazepine to 300 mg when he wakes up in 300 mg before bed in addition to his 200 mg midday dose.  Buspirone and fluoxetine will be refilled as previously prescribed.  Seroquel will be discontinued.    Review of Systems   Constitutional: Negative.    HENT: Negative.    Eyes: Negative.    Respiratory: Negative.    Cardiovascular: Negative.    Gastrointestinal: Negative.    Genitourinary: Negative.    Musculoskeletal: Negative.    Neurological: Negative.    Psychiatric/Behavioral: Positive for agitation. Negative for decreased concentration, sleep disturbance and suicidal ideas.  The patient is nervous/anxious.        Past Medical History:   Diagnosis Date   • ADHD (attention deficit hyperactivity disorder)    • Anxiety    • Bipolar disorder (CMS/HCC)    • Oppositional defiant disorder        Past Psychiatric History:  Past Psychotropic Medications:  Seroquel-works; Paxil-swelling; gabapentin-no effect; hydroxyzine-sedation; Adderall-increased agitation and irritability  Past Outpatient Care: 2016 28-day substance abuse treatment at [facility] in [place] completed successfully  Past Inpatient Care:  [facility] December 2016 for depression and suicidal ideation and substance abuse  Past Care - Other:  Dr. Villalta; Dr. Arnold    Family History   Problem Relation Age of Onset   • Bipolar disorder Mother    • Alcohol Abuse Father    • Substance Abuse Father    • Anxiety disorder Sister    • Depression Sister        Detailed Social History:  Social History     Tobacco Use   • Smoking status: Current Every Day Smoker     Packs/day: 1.00   • Smokeless tobacco: Never Used   Substance Use Topics   • Alcohol use: Yes     Comment: 6 beers few times/mo   • Drug use: Not Currently       Objective   Physical Exam  Vitals signs reviewed.   Constitutional:       Appearance: Normal appearance.   Neurological:      Mental Status: He is alert and oriented to person, place, and time.   Psychiatric:         Attention and Perception: Attention and perception normal.         Mood and Affect: Mood and affect normal.         Speech: Speech normal.         Behavior: Behavior normal. Behavior is cooperative.         Thought Content: Thought content normal. Thought content does not include homicidal or suicidal ideation.         Cognition and Memory: Cognition and memory normal.         Judgment: Judgment normal.       Current Outpatient Medications   Medication Sig   • oseltamivir (TAMIFLU) 75 MG capsule TK 1 C PO D   • busPIRone (BUSPAR) 10 MG tablet Take 2 tablets by mouth 3 times daily.   • carBAMazepine  (TEGRETOL) 200 MG tablet Take 1.5 tablets by mouth 2 times daily AND 1 tablet daily.   • fluoxetine (PROZAC) 40 MG capsule Take 1 capsule by mouth daily.     No current facility-administered medications for this visit.          Assessment & Plan:    Diagnoses and all orders for this visit:  Generalized anxiety disorder  -     busPIRone (BUSPAR) 10 MG tablet; Take 2 tablets by mouth 3 times daily.  -     fluoxetine (PROZAC) 40 MG capsule; Take 1 capsule by mouth daily.  Episodic mood disorder (CMS/HCC)  -     carBAMazepine (TEGRETOL) 200 MG tablet; Take 1.5 tablets by mouth 2 times daily AND 1 tablet daily.  Mild episode of recurrent major depressive disorder (CMS/HCC)  -     fluoxetine (PROZAC) 40 MG capsule; Take 1 capsule by mouth daily.  Attention deficit hyperactivity disorder (ADHD), predominantly inattentive type      My recommendation is:   1.  Increase carbamazepine from 200 mg 3 times daily to 300 mg when he first wakes up and before bed and 200 mg in the afternoon for mood stabilization.  Goals partially met.  2.  Refill buspirone for anxiety.  Goals partially met..  3.  Refill Prozac for anxiety and depression.   Goals partially met.  4.  Discontinue Seroquel due to increased depression.  5.  Contact the employee assistance program.  6.  Contact the office for any problems, questions, or concerns.

## 2024-03-12 LAB — LACTATE SERPL-SCNC: 2.3 MMOL/L — HIGH (ref 0.5–2)

## 2024-03-12 RX ORDER — SODIUM CHLORIDE 9 MG/ML
1000 INJECTION INTRAMUSCULAR; INTRAVENOUS; SUBCUTANEOUS
Refills: 0 | Status: DISCONTINUED | OUTPATIENT
Start: 2024-03-12 | End: 2024-03-14

## 2024-03-12 RX ADMIN — SODIUM CHLORIDE 50 MILLILITER(S): 9 INJECTION INTRAMUSCULAR; INTRAVENOUS; SUBCUTANEOUS at 10:30

## 2024-03-12 RX ADMIN — Medication 3 MILLILITER(S): at 13:03

## 2024-03-12 RX ADMIN — Medication 20 MILLIGRAM(S): at 22:55

## 2024-03-12 RX ADMIN — Medication 75 MILLIGRAM(S): at 17:00

## 2024-03-12 RX ADMIN — Medication 20 MILLIGRAM(S): at 13:02

## 2024-03-12 RX ADMIN — Medication 3 MILLILITER(S): at 23:18

## 2024-03-12 RX ADMIN — Medication 100 MILLIGRAM(S): at 10:30

## 2024-03-12 RX ADMIN — Medication 75 MILLIGRAM(S): at 06:37

## 2024-03-12 RX ADMIN — ENOXAPARIN SODIUM 40 MILLIGRAM(S): 100 INJECTION SUBCUTANEOUS at 22:56

## 2024-03-12 RX ADMIN — Medication 20 MILLIGRAM(S): at 06:36

## 2024-03-12 RX ADMIN — Medication 3 MILLILITER(S): at 17:00

## 2024-03-12 NOTE — PROGRESS NOTE ADULT - SUBJECTIVE AND OBJECTIVE BOX
Date of Service: 03-12-24 @ 13:22    Patient is a 29y old  Female who presents with a chief complaint of The patient is a 29y Female complaining of shortness of breath. (11 Mar 2024 11:53)      Any change in ROS:   Still with dyspnea at times, chest tightness  O2 sats mid 90s RA  Cough worse at night, unable to sleep     MEDICATIONS  (STANDING):  albuterol/ipratropium for Nebulization 3 milliLiter(s) Nebulizer every 6 hours  enoxaparin Injectable 40 milliGRAM(s) SubCutaneous every 24 hours  hydrocodone/homatropine Syrup 5 milliLiter(s) Oral at bedtime  influenza   Vaccine 0.5 milliLiter(s) IntraMuscular once  methylPREDNISolone sodium succinate Injectable 20 milliGRAM(s) IV Push every 8 hours  oseltamivir 75 milliGRAM(s) Oral two times a day  sodium chloride 0.9%. 1000 milliLiter(s) (50 mL/Hr) IV Continuous <Continuous>    MEDICATIONS  (PRN):  benzonatate 100 milliGRAM(s) Oral three times a day PRN Cough    Vital Signs Last 24 Hrs  T(C): 36.7 (12 Mar 2024 11:51), Max: 37.1 (12 Mar 2024 04:42)  T(F): 98 (12 Mar 2024 11:51), Max: 98.7 (12 Mar 2024 04:42)  HR: 84 (12 Mar 2024 11:51) (80 - 98)  BP: 135/80 (12 Mar 2024 11:51) (116/72 - 135/80)  BP(mean): --  RR: 18 (12 Mar 2024 11:51) (18 - 18)  SpO2: 95% (12 Mar 2024 11:51) (95% - 97%)    Parameters below as of 12 Mar 2024 11:51  Patient On (Oxygen Delivery Method): room air      Physical Exam:   GENERAL: NAD  HEENT: SABA  ENMT: No tonsillar erythema, exudates, or enlargement  NECK: Supple, No JVD  CHEST/LUNG: Decreased BS   CVS: Regular rate and rhythm  GI: : Soft, Nontender, Nondistended  NERVOUS SYSTEM:  Alert & Oriented X3  EXTREMITIES:  2+ Peripheral Pulses, No clubbing, cyanosis, or edema  SKIN: No rashes or lesions  PSYCH: Appropriate    Labs:  21.0                            14.0   9.14  )-----------( 216      ( 10 Mar 2024 15:29 )             42.5     03-10    137  |  101  |  14  ----------------------------<  108<H>  4.0   |  21<L>  |  1.17    Ca    9.4      10 Mar 2024 15:29    TPro  8.4<H>  /  Alb  4.6  /  TBili  0.4  /  DBili  x   /  AST  20  /  ALT  19  /  AlkPhos  61  03-10    LIVER FUNCTIONS - ( 10 Mar 2024 15:29 )  Alb: 4.6 g/dL / Pro: 8.4 g/dL / ALK PHOS: 61 U/L / ALT: 19 U/L / AST: 20 U/L / GGT: x           Urinalysis Basic - ( 10 Mar 2024 15:29 )    Color: x / Appearance: x / SG: x / pH: x  Gluc: 108 mg/dL / Ketone: x  / Bili: x / Urobili: x   Blood: x / Protein: x / Nitrite: x   Leuk Esterase: x / RBC: x / WBC x   Sq Epi: x / Non Sq Epi: x / Bacteria: x      Lactate, Blood: 2.3 mmol/L (03-12 @ 08:10)    Studies  c< from: Xray Chest 2 Views PA/Lat (03.10.24 @ 16:29) >        INTERPRETATION:  CLINICAL INFORMATION: Shortness of breath    TECHNIQUE: Frontal and lateral view(s) of the chest.    COMPARISON: No comparison available.    FINDINGS:  The cardiac silhouette is normal in size.  No focal consolidation, pleural effusion or pneumothorax.  Visualized osseous structures are unremarkable for the patient's age.    IMPRESSION:  Clear lungs.    --- End of Report ---    < end of copied text >

## 2024-03-12 NOTE — PROGRESS NOTE ADULT - SUBJECTIVE AND OBJECTIVE BOX
Patient is a 29y old  Female who presents with a chief complaint of The patient is a 29y Female complaining of shortness of breath. (11 Mar 2024 09:55)      SUBJECTIVE / OVERNIGHT EVENTS:    Events noted.  CONSTITUTIONAL: No fever,  or fatigue  RESPIRATORY: On supp O2  CARDIOVASCULAR: No chest pain, palpitations, dizziness, or leg swelling  GASTROINTESTINAL: No abdominal or epigastric pain.       MEDICATIONS  (STANDING):  albuterol/ipratropium for Nebulization 3 milliLiter(s) Nebulizer every 6 hours  enoxaparin Injectable 40 milliGRAM(s) SubCutaneous every 24 hours  influenza   Vaccine 0.5 milliLiter(s) IntraMuscular once  methylPREDNISolone sodium succinate Injectable 20 milliGRAM(s) IV Push every 8 hours  oseltamivir 75 milliGRAM(s) Oral two times a day    MEDICATIONS  (PRN):        CAPILLARY BLOOD GLUCOSE        I&O's Summary      T(C): 36.7 (03-11-24 @ 12:08), Max: 39.1 (03-10-24 @ 14:32)  HR: 97 (03-11-24 @ 12:08) (90 - 141)  BP: 135/89 (03-11-24 @ 12:08) (108/66 - 135/89)  RR: 18 (03-11-24 @ 12:08) (18 - 30)  SpO2: 95% (03-11-24 @ 12:08) (92% - 99%)    PHYSICAL EXAM:    NECK: Supple, No JVD  CHEST/LUNG: Bl rhonchi  HEART: Regular rate and rhythm; No murmurs, rubs, or gallops  ABDOMEN: Soft, Nontender, Nondistended; Bowel sounds present  EXTREMITIES:   No edema  NEUROLOGY: AAO X 3      LABS:                        14.0   9.14  )-----------( 216      ( 10 Mar 2024 15:29 )             42.5     03-10    137  |  101  |  14  ----------------------------<  108<H>  4.0   |  21<L>  |  1.17    Ca    9.4      10 Mar 2024 15:29    TPro  8.4<H>  /  Alb  4.6  /  TBili  0.4  /  DBili  x   /  AST  20  /  ALT  19  /  AlkPhos  61  03-10          Urinalysis Basic - ( 10 Mar 2024 15:29 )    Color: x / Appearance: x / SG: x / pH: x  Gluc: 108 mg/dL / Ketone: x  / Bili: x / Urobili: x   Blood: x / Protein: x / Nitrite: x   Leuk Esterase: x / RBC: x / WBC x   Sq Epi: x / Non Sq Epi: x / Bacteria: x      CAPILLARY BLOOD GLUCOSE            RADIOLOGY & ADDITIONAL TESTS:    Imaging Personally Reviewed:    Consultant(s) Notes Reviewed:      Care Discussed with Consultants/Other Providers:    De Clark MD, CMD, FACP    [address] [place] [address]  Office Tel: [phone]  Cell: [phone]

## 2024-03-13 LAB
ANION GAP SERPL CALC-SCNC: 13 MMOL/L — SIGNIFICANT CHANGE UP (ref 5–17)
BUN SERPL-MCNC: 15 MG/DL — SIGNIFICANT CHANGE UP (ref 7–23)
CALCIUM SERPL-MCNC: 9.3 MG/DL — SIGNIFICANT CHANGE UP (ref 8.4–10.5)
CHLORIDE SERPL-SCNC: 103 MMOL/L — SIGNIFICANT CHANGE UP (ref 96–108)
CO2 SERPL-SCNC: 21 MMOL/L — LOW (ref 22–31)
CREAT SERPL-MCNC: 0.89 MG/DL — SIGNIFICANT CHANGE UP (ref 0.5–1.3)
EGFR: 90 ML/MIN/1.73M2 — SIGNIFICANT CHANGE UP
GLUCOSE SERPL-MCNC: 87 MG/DL — SIGNIFICANT CHANGE UP (ref 70–99)
HCT VFR BLD CALC: 40.7 % — SIGNIFICANT CHANGE UP (ref 34.5–45)
HGB BLD-MCNC: 13.2 G/DL — SIGNIFICANT CHANGE UP (ref 11.5–15.5)
LACTATE SERPL-SCNC: 1.7 MMOL/L — SIGNIFICANT CHANGE UP (ref 0.5–2)
MCHC RBC-ENTMCNC: 28.1 PG — SIGNIFICANT CHANGE UP (ref 27–34)
MCHC RBC-ENTMCNC: 32.4 GM/DL — SIGNIFICANT CHANGE UP (ref 32–36)
MCV RBC AUTO: 86.6 FL — SIGNIFICANT CHANGE UP (ref 80–100)
NRBC # BLD: 0 /100 WBCS — SIGNIFICANT CHANGE UP (ref 0–0)
PLATELET # BLD AUTO: 313 K/UL — SIGNIFICANT CHANGE UP (ref 150–400)
POTASSIUM SERPL-MCNC: 4.2 MMOL/L — SIGNIFICANT CHANGE UP (ref 3.5–5.3)
POTASSIUM SERPL-SCNC: 4.2 MMOL/L — SIGNIFICANT CHANGE UP (ref 3.5–5.3)
RBC # BLD: 4.7 M/UL — SIGNIFICANT CHANGE UP (ref 3.8–5.2)
RBC # FLD: 14.2 % — SIGNIFICANT CHANGE UP (ref 10.3–14.5)
SODIUM SERPL-SCNC: 137 MMOL/L — SIGNIFICANT CHANGE UP (ref 135–145)
WBC # BLD: 14.13 K/UL — HIGH (ref 3.8–10.5)
WBC # FLD AUTO: 14.13 K/UL — HIGH (ref 3.8–10.5)

## 2024-03-13 RX ADMIN — Medication 3 MILLILITER(S): at 13:07

## 2024-03-13 RX ADMIN — Medication 75 MILLIGRAM(S): at 06:00

## 2024-03-13 RX ADMIN — ENOXAPARIN SODIUM 40 MILLIGRAM(S): 100 INJECTION SUBCUTANEOUS at 22:23

## 2024-03-13 RX ADMIN — Medication 20 MILLIGRAM(S): at 13:07

## 2024-03-13 RX ADMIN — Medication 3 MILLILITER(S): at 22:27

## 2024-03-13 RX ADMIN — Medication 75 MILLIGRAM(S): at 17:31

## 2024-03-13 RX ADMIN — Medication 20 MILLIGRAM(S): at 17:31

## 2024-03-13 RX ADMIN — Medication 3 MILLILITER(S): at 17:30

## 2024-03-13 RX ADMIN — Medication 3 MILLILITER(S): at 06:01

## 2024-03-13 RX ADMIN — Medication 20 MILLIGRAM(S): at 06:05

## 2024-03-13 NOTE — PROGRESS NOTE ADULT - SUBJECTIVE AND OBJECTIVE BOX
Patient is a 29y old  Female who presents with a chief complaint of The patient is a 29y Female complaining of shortness of breath. (13 Mar 2024 13:12)      INTERVAL HPI/OVERNIGHT EVENTS: seen and examined , wheezing improving   T(C): 37 (03-13-24 @ 21:02), Max: 37 (03-13-24 @ 15:57)  HR: 92 (03-13-24 @ 21:02) (74 - 92)  BP: 122/78 (03-13-24 @ 21:02) (121/72 - 130/86)  RR: 18 (03-13-24 @ 21:02) (18 - 19)  SpO2: 95% (03-13-24 @ 21:02) (92% - 96%)  Wt(kg): --  I&O's Summary    13 Mar 2024 07:01  -  13 Mar 2024 23:46  --------------------------------------------------------  IN: 240 mL / OUT: 0 mL / NET: 240 mL        PAST MEDICAL & SURGICAL HISTORY:  No pertinent past medical history      Asthma      H/O unilateral nephrectomy          SOCIAL HISTORY  Alcohol:  Tobacco:  Illicit substance use:    FAMILY HISTORY:    REVIEW OF SYSTEMS:  CONSTITUTIONAL: No fever, weight loss, or fatigue  EYES: No eye pain, visual disturbances, or discharge  ENMT:  No difficulty hearing, tinnitus, vertigo; No sinus or throat pain  NECK: No pain or stiffness  RESPIRATORY: No cough, wheezing, chills or hemoptysis; No shortness of breath  CARDIOVASCULAR: No chest pain, palpitations, dizziness, or leg swelling  GASTROINTESTINAL: No abdominal or epigastric pain. No nausea, vomiting, or hematemesis; No diarrhea or constipation. No melena or hematochezia.  GENITOURINARY: No dysuria, frequency, hematuria, or incontinence  NEUROLOGICAL: No headaches, memory loss, loss of strength, numbness, or tremors  SKIN: No itching, burning, rashes, or lesions   LYMPH NODES: No enlarged glands  ENDOCRINE: No heat or cold intolerance; No hair loss  MUSCULOSKELETAL: No joint pain or swelling; No muscle, back, or extremity pain  PSYCHIATRIC: No depression, anxiety, mood swings, or difficulty sleeping  HEME/LYMPH: No easy bruising, or bleeding gums  ALLERY AND IMMUNOLOGIC: No hives or eczema    RADIOLOGY & ADDITIONAL TESTS:    Imaging Personally Reviewed:  [ ] YES  [ ] NO    Consultant(s) Notes Reviewed:  [ ] YES  [ ] NO    PHYSICAL EXAM:  GENERAL: NAD, well-groomed, well-developed  HEAD:  Atraumatic, Normocephalic  EYES: EOMI, PERRLA, conjunctiva and sclera clear  ENMT: No tonsillar erythema, exudates, or enlargement; Moist mucous membranes, Good dentition, No lesions  NECK: Supple, No JVD, Normal thyroid  NERVOUS SYSTEM:  Alert & Oriented X3, Good concentration; Motor Strength 5/5 B/L upper and lower extremities; DTRs 2+ intact and symmetric  CHEST/LUNG: Clear to percussion bilaterally; No rales, rhonchi, wheezing, or rubs  HEART: Regular rate and rhythm; No murmurs, rubs, or gallops  ABDOMEN: Soft, Nontender, Nondistended; Bowel sounds present  EXTREMITIES:  2+ Peripheral Pulses, No clubbing, cyanosis, or edema  LYMPH: No lymphadenopathy noted  SKIN: No rashes or lesions    LABS:                        13.2   14.13 )-----------( 313      ( 13 Mar 2024 07:31 )             40.7     03-13    137  |  103  |  15  ----------------------------<  87  4.2   |  21<L>  |  0.89    Ca    9.3      13 Mar 2024 07:29        Urinalysis Basic - ( 13 Mar 2024 07:29 )    Color: x / Appearance: x / SG: x / pH: x  Gluc: 87 mg/dL / Ketone: x  / Bili: x / Urobili: x   Blood: x / Protein: x / Nitrite: x   Leuk Esterase: x / RBC: x / WBC x   Sq Epi: x / Non Sq Epi: x / Bacteria: x      CAPILLARY BLOOD GLUCOSE            Urinalysis Basic - ( 13 Mar 2024 07:29 )    Color: x / Appearance: x / SG: x / pH: x  Gluc: 87 mg/dL / Ketone: x  / Bili: x / Urobili: x   Blood: x / Protein: x / Nitrite: x   Leuk Esterase: x / RBC: x / WBC x   Sq Epi: x / Non Sq Epi: x / Bacteria: x        MEDICATIONS  (STANDING):  albuterol/ipratropium for Nebulization 3 milliLiter(s) Nebulizer every 6 hours  enoxaparin Injectable 40 milliGRAM(s) SubCutaneous every 24 hours  hydrocodone/homatropine Syrup 5 milliLiter(s) Oral at bedtime  influenza   Vaccine 0.5 milliLiter(s) IntraMuscular once  oseltamivir 75 milliGRAM(s) Oral two times a day  predniSONE   Tablet 20 milliGRAM(s) Oral two times a day  sodium chloride 0.9%. 1000 milliLiter(s) (50 mL/Hr) IV Continuous <Continuous>    MEDICATIONS  (PRN):  benzonatate 100 milliGRAM(s) Oral three times a day PRN Cough      Care Discussed with Consultants/Other Providers [ ] YES  [ ] NO

## 2024-03-13 NOTE — PROGRESS NOTE ADULT - SUBJECTIVE AND OBJECTIVE BOX
Date of Service: 03-13-24 @ 13:12    Patient is a 29y old  Female who presents with a chief complaint of The patient is a 29y Female complaining of shortness of breath. (12 Mar 2024 15:17)      Any change in ROS:   Chest tightness, dyspnea improving today - pt reports only with coughing  O2 sats 97% RA     MEDICATIONS  (STANDING):  albuterol/ipratropium for Nebulization 3 milliLiter(s) Nebulizer every 6 hours  enoxaparin Injectable 40 milliGRAM(s) SubCutaneous every 24 hours  hydrocodone/homatropine Syrup 5 milliLiter(s) Oral at bedtime  influenza   Vaccine 0.5 milliLiter(s) IntraMuscular once  methylPREDNISolone sodium succinate Injectable 20 milliGRAM(s) IV Push every 8 hours  oseltamivir 75 milliGRAM(s) Oral two times a day  sodium chloride 0.9%. 1000 milliLiter(s) (50 mL/Hr) IV Continuous <Continuous>    MEDICATIONS  (PRN):  benzonatate 100 milliGRAM(s) Oral three times a day PRN Cough    Vital Signs Last 24 Hrs  T(C): 36.9 (13 Mar 2024 05:32), Max: 36.9 (13 Mar 2024 05:32)  T(F): 98.4 (13 Mar 2024 05:32), Max: 98.4 (13 Mar 2024 05:32)  HR: 88 (13 Mar 2024 05:32) (79 - 88)  BP: 123/78 (13 Mar 2024 05:32) (121/77 - 123/78)  BP(mean): --  RR: 18 (13 Mar 2024 05:32) (18 - 18)  SpO2: 96% (13 Mar 2024 05:32) (95% - 96%)    Parameters below as of 13 Mar 2024 05:32  Patient On (Oxygen Delivery Method): room air        I&O's Summary        Physical Exam:   GENERAL: NAD  HEENT: SABA  ENMT: No tonsillar erythema, exudates, or enlargement  NECK: Supple, No JVD  CHEST/LUNG: Decreased BS   CVS: Regular rate and rhythm  GI: : Soft, Nontender, Nondistended  NERVOUS SYSTEM:  Alert & Oriented X3  EXTREMITIES:  2+ Peripheral Pulses, No clubbing, cyanosis, or edema  SKIN: No rashes or lesions  PSYCH: Appropriate    Labs:  21.0                            13.2   14.13 )-----------( 313      ( 13 Mar 2024 07:31 )             40.7                         14.0   9.14  )-----------( 216      ( 10 Mar 2024 15:29 )             42.5     03-13    137  |  103  |  15  ----------------------------<  87  4.2   |  21<L>  |  0.89  03-10    137  |  101  |  14  ----------------------------<  108<H>  4.0   |  21<L>  |  1.17    Ca    9.3      13 Mar 2024 07:29    TPro  8.4<H>  /  Alb  4.6  /  TBili  0.4  /  DBili  x   /  AST  20  /  ALT  19  /  AlkPhos  61  03-10    Urinalysis Basic - ( 13 Mar 2024 07:29 )    Color: x / Appearance: x / SG: x / pH: x  Gluc: 87 mg/dL / Ketone: x  / Bili: x / Urobili: x   Blood: x / Protein: x / Nitrite: x   Leuk Esterase: x / RBC: x / WBC x   Sq Epi: x / Non Sq Epi: x / Bacteria: x      Lactate, Blood: 1.7 mmol/L (03-13 @ 07:32)  Lactate, Blood: 2.3 mmol/L (03-12 @ 08:10)    Studies  Chest X-RAY  < from: Xray Chest 2 Views PA/Lat (03.10.24 @ 16:29) >    ACC: [id number] EXAM:  XR CHEST PA LAT 2V   ORDERED BY:  RITESH AWAD DATE:  03/10/2024          INTERPRETATION:  CLINICAL INFORMATION: Shortness of breath    TECHNIQUE: Frontal and lateral view(s) of the chest.    COMPARISON: No comparison available.    FINDINGS:  The cardiac silhouette is normal in size.  No focal consolidation, pleural effusion or pneumothorax.  Visualized osseous structures are unremarkable for the patient's age.    IMPRESSION:  Clear lungs.    --- End of Report ---      < end of copied text >

## 2024-03-14 ENCOUNTER — TRANSCRIPTION ENCOUNTER (OUTPATIENT)
Age: 29
End: 2024-03-14

## 2024-03-14 VITALS
OXYGEN SATURATION: 95 % | HEART RATE: 80 BPM | TEMPERATURE: 98 F | RESPIRATION RATE: 18 BRPM | DIASTOLIC BLOOD PRESSURE: 78 MMHG | SYSTOLIC BLOOD PRESSURE: 130 MMHG

## 2024-03-14 PROCEDURE — 84295 ASSAY OF SERUM SODIUM: CPT

## 2024-03-14 PROCEDURE — 82435 ASSAY OF BLOOD CHLORIDE: CPT

## 2024-03-14 PROCEDURE — 83605 ASSAY OF LACTIC ACID: CPT

## 2024-03-14 PROCEDURE — 82947 ASSAY GLUCOSE BLOOD QUANT: CPT

## 2024-03-14 PROCEDURE — 82803 BLOOD GASES ANY COMBINATION: CPT

## 2024-03-14 PROCEDURE — 85025 COMPLETE CBC W/AUTO DIFF WBC: CPT

## 2024-03-14 PROCEDURE — 82330 ASSAY OF CALCIUM: CPT

## 2024-03-14 PROCEDURE — 99285 EMERGENCY DEPT VISIT HI MDM: CPT

## 2024-03-14 PROCEDURE — 36415 COLL VENOUS BLD VENIPUNCTURE: CPT

## 2024-03-14 PROCEDURE — 80048 BASIC METABOLIC PNL TOTAL CA: CPT

## 2024-03-14 PROCEDURE — 85027 COMPLETE CBC AUTOMATED: CPT

## 2024-03-14 PROCEDURE — 80053 COMPREHEN METABOLIC PANEL: CPT

## 2024-03-14 PROCEDURE — 84132 ASSAY OF SERUM POTASSIUM: CPT

## 2024-03-14 PROCEDURE — 96374 THER/PROPH/DIAG INJ IV PUSH: CPT

## 2024-03-14 PROCEDURE — 85014 HEMATOCRIT: CPT

## 2024-03-14 PROCEDURE — 94640 AIRWAY INHALATION TREATMENT: CPT

## 2024-03-14 PROCEDURE — 71046 X-RAY EXAM CHEST 2 VIEWS: CPT

## 2024-03-14 PROCEDURE — 85018 HEMOGLOBIN: CPT

## 2024-03-14 PROCEDURE — 87637 SARSCOV2&INF A&B&RSV AMP PRB: CPT

## 2024-03-14 PROCEDURE — 96375 TX/PRO/DX INJ NEW DRUG ADDON: CPT

## 2024-03-14 RX ORDER — ALBUTEROL 90 UG/1
2 AEROSOL, METERED ORAL
Refills: 0 | DISCHARGE

## 2024-03-14 RX ORDER — BUDESONIDE AND FORMOTEROL FUMARATE DIHYDRATE 160; 4.5 UG/1; UG/1
2 AEROSOL RESPIRATORY (INHALATION)
Qty: 1 | Refills: 0
Start: 2024-03-14 | End: 2024-04-12

## 2024-03-14 RX ORDER — ALBUTEROL 90 UG/1
2 AEROSOL, METERED ORAL
Qty: 1 | Refills: 0
Start: 2024-03-14 | End: 2024-04-12

## 2024-03-14 RX ADMIN — Medication 20 MILLIGRAM(S): at 06:43

## 2024-03-14 RX ADMIN — Medication 3 MILLILITER(S): at 06:45

## 2024-03-14 RX ADMIN — Medication 75 MILLIGRAM(S): at 06:44

## 2024-03-14 NOTE — DISCHARGE NOTE PROVIDER - NSDCFUADDAPPT_GEN_ALL_CORE_FT
APPTS ARE READY TO BE MADE: [ X] YES    Best Family or Patient Contact (if needed):    Additional Information about above appointments (if needed):    1: Dr. Trejo  2:   3:     Other comments or requests:

## 2024-03-14 NOTE — DISCHARGE NOTE PROVIDER - NSDCCPCAREPLAN_GEN_ALL_CORE_FT
PRINCIPAL DISCHARGE DIAGNOSIS  Diagnosis: Acute asthma exacerbation  Assessment and Plan of Treatment: Take medicatin as directed  Follow-up with your Pulmonary Doctor      SECONDARY DISCHARGE DIAGNOSES  Diagnosis: Influenza A  Assessment and Plan of Treatment: Continue Tamiflu as directed until 3/15/24  Supportive Care

## 2024-03-14 NOTE — DISCHARGE NOTE PROVIDER - NSDCMRMEDTOKEN_GEN_ALL_CORE_FT
Albuterol (Eqv-ProAir HFA) 90 mcg/inh inhalation aerosol: 2 puff(s) inhaled every 6 hours as needed  benzonatate 200 mg oral capsule: 1 cap(s) orally 2 times a day  methocarbamol 500 mg oral tablet: 2 tab(s) orally every 8 hours   predniSONE 20 mg oral tablet: 1 tab(s) orally once a day   predniSONE 20 mg oral tablet: 2 tab(s) orally once a day   Albuterol (Eqv-ProAir HFA) 90 mcg/inh inhalation aerosol: 2 puff(s) inhaled every 6 hours as needed  benzonatate 100 mg oral capsule: 1 cap(s) orally 3 times a day as needed for Cough  methocarbamol 500 mg oral tablet: 2 tab(s) orally every 8 hours   oseltamivir 75 mg oral capsule: 1 cap(s) orally 2 times a day  predniSONE 20 mg oral tablet: 1 tab(s) orally 2 times a day last dose 3/18/24   Albuterol (Eqv-ProAir HFA) 90 mcg/inh inhalation aerosol: 2 puff(s) inhaled every 6 hours as needed  benzonatate 100 mg oral capsule: 1 cap(s) orally 3 times a day as needed for Cough  methocarbamol 500 mg oral tablet: 2 tab(s) orally every 8 hours   oseltamivir 75 mg oral capsule: 1 cap(s) orally 2 times a day  predniSONE 20 mg oral tablet: 1 tab(s) orally 2 times a day last dose 3/18/24  Symbicort 160 mcg-4.5 mcg/inh inhalation aerosol: 2 puff(s) inhaled 2 times a day

## 2024-03-14 NOTE — DISCHARGE NOTE PROVIDER - NSFOLLOWUPCLINICS_GEN_ALL_ED_FT
[facility]ol[facility] and Sleep [facility]  Pulmonology  [address] [place] [address], [place] [address]  Phone: [phone]  Fax:

## 2024-03-14 NOTE — CHART NOTE - NSCHARTNOTEFT_GEN_A_CORE
Discharge Note:    Requested by Dr. Clark to facilitate d/c home.  Pt is saturating well on room air and cleared by pulmonary (Dr. Trejo)  V/s, labs, diagnostics reviewed, pt stable to d/c now.  Medication reconciliation reviewed, revised, and resolved with Dr. Clark who medically cleared w/ f/u as directed.   Please refer to d/c note for hospital details.    Leydi Ramirez NP-c

## 2024-03-14 NOTE — DISCHARGE NOTE PROVIDER - HOSPITAL COURSE
HPI:   29 female, history of asthma not requiring intubations, kidney donation elective, presenting for shortness of breath and cough since last night.  Using inhalers at home without relief, last use 15 minutes prior to arrival.  No chest pain, abdominal pain, sick contacts.  Has never been admitted or intubated for her asthma.   (10 Mar 2024 19:06)    Hospital Course: 28 y/o F with PMH of asthma, elective kidney donation. Presents with SOB, cough unrelieved with use of home inhaler. Found to be + for Influenza A on admission. CXR grossly clear. Pulmonary called to consult for asthma exacerbation. Acute asthma exacerbation due to Influenza A infection. Continue Tamiflu until 3/15/24.  S/p IV steroids, started Prednisone 20 mg PO BID x5 days. Continue Duoneb q6h, resume PRN Albuterol on discharge. Hycodan 5cc qHS for cough (hold for sedation). Saturating well on room air and does not require oxygen supplementation.  Outpatient pulmonary f/u on discharge, eventual outpatient PFTs.    Important Medication Changes and Reason:    Active or Pending Issues Requiring Follow-up:  Follow-up with your Primary Care Doctor and Pulmonologist     Advanced Directives:   [X] Full code  [ ] DNR  [ ] Hospice    Discharge Diagnoses:  Asthma Exacerbation  Influenza A     HPI:   29 female, history of asthma not requiring intubations, kidney donation elective, presenting for shortness of breath and cough since last night.  Using inhalers at home without relief, last use 15 minutes prior to arrival.  No chest pain, abdominal pain, sick contacts.  Has never been admitted or intubated for her asthma.   (10 Mar 2024 19:06)    Hospital Course: 28 y/o F with PMH of asthma, elective kidney donation. Presents with SOB, cough unrelieved with use of home inhaler. Found to be + for Influenza A on admission. CXR grossly clear. Pulmonary called to consult for asthma exacerbation. Acute asthma exacerbation due to Influenza A infection. Continue Tamiflu until 3/15/24.  S/p IV steroids, started Prednisone 20 mg PO BID x5 days. Continue Duoneb q6h, resume PRN Albuterol on discharge. Hycodan 5cc qHS for cough (hold for sedation). Saturating well on room air and does not require oxygen supplementation. Outpatient pulmonary f/u on discharge, eventual outpatient PFTs.    Important Medication Changes and Reason:    Active or Pending Issues Requiring Follow-up:  Follow-up with your Primary Care Doctor and Pulmonologist     Advanced Directives:   [X] Full code  [ ] DNR  [ ] Hospice    Discharge Diagnoses:  Asthma Exacerbation  Influenza A

## 2024-03-14 NOTE — PROGRESS NOTE ADULT - NS ATTEND AMEND GEN_ALL_CORE FT
she seems to be much better:  still a little bronchoactive: changed to po asteroids: but overall feels much better:  dc planning in 1-2 days
she look s better but still feels tight:  would cont iv steroids today too :  and change to po in am:  she is on room air today
pt seems to be much better:  has some cough on deep inspiration:  for dc today  ; can cont steroids 40 mg for 5 days and add symbicort for some time:   she can follow with me in two weeks time:  she would need outptp P FT

## 2024-03-14 NOTE — PROGRESS NOTE ADULT - PROBLEM SELECTOR PLAN 1
2nd to Influenza A infection  -SOB, chest tightness improved per patient. D/c IV steroids, start Prednisone 20 mg PO BID x5 days  -Continue Duoneb q6h, resume PRN Albuterol on discharge   -Keep sats >90% with O2 PRN. Tolerating room air today  -Hycodan 5cc qHS for cough (hold for sedation)  -Suggest outpatient pulmonary f/u on discharge, eventual outpatient PFTs.
2nd to Influenza A infection  -SOB, chest tightness improved per patient. S/p IV steroids, Prednisone 20 mg PO BID x5 days ordered 3/13.   -Continue Duoneb q6h, resume PRN Albuterol on discharge   -Keep sats >90% with O2 PRN. Now tolerating room air   -Hycodan 5cc qHS for cough (hold for sedation). Can continue for few more days then stop.   -Suggest outpatient pulmonary f/u on discharge, eventual outpatient PFTs.
2nd to Influenza A infection  -Continue Solumedrol 20 mg IVP q8h, will continue to evaluate further taper  -Continue Duoneb q6h  -Check peak flow  -Keep sats >90% with O2 PRN. Tolerating room air today  -Hycodan 5cc qHS for cough (hold for sedation)  -Suggest outpatient pulmonary f/u on discharge, eventual outpatient PFTs.

## 2024-03-14 NOTE — PROGRESS NOTE ADULT - PROBLEM SELECTOR PLAN 2
+ Influenza A  -Bronchodilators/steroids as above  -CXR grossly clear  -Continue Tamiflu.

## 2024-03-14 NOTE — DISCHARGE NOTE PROVIDER - CARE PROVIDER_API CALL
Uma Malik  Internal Medicine  Tamara IZQUIERDO,    Phone: [phone]  Fax: ([phone]  Follow Up Time: 1 week    Bismark Trejo  Pulmonary Disease  [address] [place] [id number]  Phone: [phone]  Fax: [phone]  Follow Up Time: 1 week

## 2024-03-14 NOTE — DISCHARGE NOTE NURSING/CASE MANAGEMENT/SOCIAL WORK - PATIENT PORTAL LINK FT
You can access the FollowMyHealth Patient Portal offered by [facility] by registering at the following website: [email or web address]. By joining [employer]’s FollowMyHealth portal, you will also be able to view your health information using other applications (apps) compatible with our system.

## 2024-03-14 NOTE — DISCHARGE NOTE PROVIDER - PROVIDER TOKENS
PROVIDER:[TOKEN:[[id number]],FOLLOWUP:[1 week]],PROVIDER:[TOKEN:[[id number]],FOLLOWUP:[1 week]]

## 2024-03-14 NOTE — PROGRESS NOTE ADULT - ASSESSMENT
28 y/o F with PMH of asthma, elective kidney donation. Presents with SOB, cough unrelieved with use of home inhaler. Found to be + for Influenza A on admission. CXR grossly clear. Pulmonary called to consult for asthma exacerbation.
A/P     Influenza :   c/w Tamiflu     Ac Asthma exacerbation :   seen by pul   changed IV steroids to PO : taper   c/w neb Rx     dispo: if stable plan for d/c home in am on tapering steroids and complete 5 days of tamiflu    kevin dallas MD  covering Dr. Clark 
The patient is a 29y Female complaining of shortness of breath.    Br Asthma Exa:    Likely from Influenza  Duoneb  IV Solumedrol  Supp O2 PRN  Pul eval appreciated.    Influenza:    Tamiflu  Droplet precautions          
The patient is a 29y Female complaining of shortness of breath.    Br Asthma Exa:    Likely from Influenza  Duoneb  IV Solumedrol  Supp O2 PRN  Pul f/up  appreciated.    Influenza:    Tamiflu  Droplet precautions          
28 y/o F with PMH of asthma, elective kidney donation. Presents with SOB, cough unrelieved with use of home inhaler. Found to be + for Influenza A on admission. CXR grossly clear. Pulmonary called to consult for asthma exacerbation.
30 y/o F with PMH of asthma, elective kidney donation. Presents with SOB, cough unrelieved with use of home inhaler. Found to be + for Influenza A on admission. CXR grossly clear. Pulmonary called to consult for asthma exacerbation.

## 2024-03-14 NOTE — PROGRESS NOTE ADULT - SUBJECTIVE AND OBJECTIVE BOX
Date of Service: 03-14-24 @ 13:11    Patient is a 29y old  Female who presents with a chief complaint of The patient is a 29y Female complaining of shortness of breath. (14 Mar 2024 10:58)      Any change in ROS:   Dyspnea & cough improving  O2 sats 94% on room air     MEDICATIONS  (STANDING):  albuterol/ipratropium for Nebulization 3 milliLiter(s) Nebulizer every 6 hours  enoxaparin Injectable 40 milliGRAM(s) SubCutaneous every 24 hours  hydrocodone/homatropine Syrup 5 milliLiter(s) Oral at bedtime  influenza   Vaccine 0.5 milliLiter(s) IntraMuscular once  oseltamivir 75 milliGRAM(s) Oral two times a day  predniSONE   Tablet 20 milliGRAM(s) Oral two times a day  sodium chloride 0.9%. 1000 milliLiter(s) (50 mL/Hr) IV Continuous <Continuous>    MEDICATIONS  (PRN):  benzonatate 100 milliGRAM(s) Oral three times a day PRN Cough    Vital Signs Last 24 Hrs  T(C): 36.8 (14 Mar 2024 04:37), Max: 37 (13 Mar 2024 15:57)  T(F): 98.2 (14 Mar 2024 04:37), Max: 98.6 (13 Mar 2024 15:57)  HR: 83 (14 Mar 2024 04:37) (74 - 92)  BP: 136/80 (14 Mar 2024 04:37) (121/72 - 136/80)  BP(mean): --  RR: 18 (14 Mar 2024 04:37) (18 - 19)  SpO2: 94% (14 Mar 2024 04:37) (92% - 95%)    Parameters below as of 14 Mar 2024 04:37  Patient On (Oxygen Delivery Method): room air      I&O's Summary    13 Mar 2024 07:01  -  14 Mar 2024 07:00  --------------------------------------------------------  IN: 240 mL / OUT: 0 mL / NET: 240 mL          Physical Exam:   GENERAL: NAD  HEENT: SABA  ENMT: No tonsillar erythema, exudates, or enlargement  NECK: Supple, No JVD  CHEST/LUNG: Decreased BS   CVS: Regular rate and rhythm  GI: : Soft, Nontender, Nondistended  NERVOUS SYSTEM:  Alert & Oriented X3  EXTREMITIES:  2+ Peripheral Pulses, No clubbing, cyanosis, or edema  SKIN: No rashes or lesions  PSYCH: Appropriate    Labs:  21.0                            13.2   14.13 )-----------( 313      ( 13 Mar 2024 07:31 )             40.7                         14.0   9.14  )-----------( 216      ( 10 Mar 2024 15:29 )             42.5     03-13    137  |  103  |  15  ----------------------------<  87  4.2   |  21<L>  |  0.89  03-10    137  |  101  |  14  ----------------------------<  108<H>  4.0   |  21<L>  |  1.17    Ca    9.3      13 Mar 2024 07:29    TPro  8.4<H>  /  Alb  4.6  /  TBili  0.4  /  DBili  x   /  AST  20  /  ALT  19  /  AlkPhos  61  03-10    CAPILLARY BLOOD GLUCOSE              Urinalysis Basic - ( 13 Mar 2024 07:29 )    Color: x / Appearance: x / SG: x / pH: x  Gluc: 87 mg/dL / Ketone: x  / Bili: x / Urobili: x   Blood: x / Protein: x / Nitrite: x   Leuk Esterase: x / RBC: x / WBC x   Sq Epi: x / Non Sq Epi: x / Bacteria: x      Lactate, Blood: 1.7 mmol/L (03-13 @ 07:32)  Lactate, Blood: 2.3 mmol/L (03-12 @ 08:10)      Studies  Chest X-RAY  < from: Xray Chest 2 Views PA/Lat (03.10.24 @ 16:29) >        INTERPRETATION:  CLINICAL INFORMATION: Shortness of breath    TECHNIQUE: Frontal and lateral view(s) of the chest.    COMPARISON: No comparison available.    FINDINGS:  The cardiac silhouette is normal in size.  No focal consolidation, pleural effusion or pneumothorax.  Visualized osseous structures are unremarkable for the patient's age.    IMPRESSION:  Clear lungs.    --- End of Report ---            < end of copied text >             Date of Service: 03-14-24 @ 13:11    Patient is a 29y old  Female who presents with a chief complaint of The patient is a 29y Female complaining of shortness of breath. (14 Mar 2024 10:58)      Any change in ROS:   Dyspnea & cough improving  O2 sats 94% on room air : she has some cough on deep inspiration     MEDICATIONS  (STANDING):  albuterol/ipratropium for Nebulization 3 milliLiter(s) Nebulizer every 6 hours  enoxaparin Injectable 40 milliGRAM(s) SubCutaneous every 24 hours  hydrocodone/homatropine Syrup 5 milliLiter(s) Oral at bedtime  influenza   Vaccine 0.5 milliLiter(s) IntraMuscular once  oseltamivir 75 milliGRAM(s) Oral two times a day  predniSONE   Tablet 20 milliGRAM(s) Oral two times a day  sodium chloride 0.9%. 1000 milliLiter(s) (50 mL/Hr) IV Continuous <Continuous>    MEDICATIONS  (PRN):  benzonatate 100 milliGRAM(s) Oral three times a day PRN Cough    Vital Signs Last 24 Hrs  T(C): 36.8 (14 Mar 2024 04:37), Max: 37 (13 Mar 2024 15:57)  T(F): 98.2 (14 Mar 2024 04:37), Max: 98.6 (13 Mar 2024 15:57)  HR: 83 (14 Mar 2024 04:37) (74 - 92)  BP: 136/80 (14 Mar 2024 04:37) (121/72 - 136/80)  BP(mean): --  RR: 18 (14 Mar 2024 04:37) (18 - 19)  SpO2: 94% (14 Mar 2024 04:37) (92% - 95%)    Parameters below as of 14 Mar 2024 04:37  Patient On (Oxygen Delivery Method): room air      I&O's Summary    13 Mar 2024 07:01  -  14 Mar 2024 07:00  --------------------------------------------------------  IN: 240 mL / OUT: 0 mL / NET: 240 mL          Physical Exam:   GENERAL: NAD  MICHAELENT: SABA  ENMT: No tonsillar erythema, exudates, or enlargement  NECK: Supple, No JVD  CHEST/LUNG: Decreased BS   CVS: Regular rate and rhythm  GI: : Soft, Nontender, Nondistended  NERVOUS SYSTEM:  Alert & Oriented X3  EXTREMITIES:  2+ Peripheral Pulses, No clubbing, cyanosis, or edema  SKIN: No rashes or lesions  PSYCH: Appropriate    Labs:  21.0                            13.2   14.13 )-----------( 313      ( 13 Mar 2024 07:31 )             40.7                         14.0   9.14  )-----------( 216      ( 10 Mar 2024 15:29 )             42.5     03-13    137  |  103  |  15  ----------------------------<  87  4.2   |  21<L>  |  0.89  03-10    137  |  101  |  14  ----------------------------<  108<H>  4.0   |  21<L>  |  1.17    Ca    9.3      13 Mar 2024 07:29    TPro  8.4<H>  /  Alb  4.6  /  TBili  0.4  /  DBili  x   /  AST  20  /  ALT  19  /  AlkPhos  61  03-10    CAPILLARY BLOOD GLUCOSE              Urinalysis Basic - ( 13 Mar 2024 07:29 )    Color: x / Appearance: x / SG: x / pH: x  Gluc: 87 mg/dL / Ketone: x  / Bili: x / Urobili: x   Blood: x / Protein: x / Nitrite: x   Leuk Esterase: x / RBC: x / WBC x   Sq Epi: x / Non Sq Epi: x / Bacteria: x      Lactate, Blood: 1.7 mmol/L (03-13 @ 07:32)  Lactate, Blood: 2.3 mmol/L (03-12 @ 08:10)      Studies  Chest X-RAY  < from: Xray Chest 2 Views PA/Lat (03.10.24 @ 16:29) >        INTERPRETATION:  CLINICAL INFORMATION: Shortness of breath    TECHNIQUE: Frontal and lateral view(s) of the chest.    COMPARISON: No comparison available.    FINDINGS:  The cardiac silhouette is normal in size.  No focal consolidation, pleural effusion or pneumothorax.  Visualized osseous structures are unremarkable for the patient's age.    IMPRESSION:  Clear lungs.    --- End of Report ---            < end of copied text >

## 2024-03-14 NOTE — PROGRESS NOTE ADULT - REASON FOR ADMISSION
The patient is a 29y Female complaining of shortness of breath.

## 2024-10-14 NOTE — ED ADULT NURSE NOTE - PAIN RATING/NUMBER SCALE (0-10): ACTIVITY
5 Consent: The patient's consent was obtained including but not limited to risks of crusting, scabbing, blistering, scarring, darker or lighter pigmentary change, recurrence, incomplete removal and infection. Show Spray Paint Technique Variable?: Yes Detail Level: Detailed Include Z78.9 (Other Specified Conditions Influencing Health Status) As An Associated Diagnosis?: No Medical Necessity Clause: This procedure was medically necessary because the lesions that were treated were: Post-Care Instructions: I reviewed with the patient in detail post-care instructions. Patient is to wear sunprotection, and avoid picking at any of the treated lesions. Pt may apply Vaseline to crusted or scabbing areas. Medical Necessity Information: It is in your best interest to select a reason for this procedure from the list below. All of these items fulfill various CMS LCD requirements except the new and changing color options. Spray Paint Text: The liquid nitrogen was applied to the skin utilizing a spray paint frosting technique.

## 2024-11-29 ENCOUNTER — TRANSCRIPTION ENCOUNTER (OUTPATIENT)
Age: 29
End: 2024-11-29

## 2024-11-29 ENCOUNTER — EMERGENCY (EMERGENCY)
Facility: HOSPITAL | Age: 29
LOS: 1 days | Discharge: HOME | End: 2024-11-29
Attending: STUDENT IN AN ORGANIZED HEALTH CARE EDUCATION/TRAINING PROGRAM
Payer: COMMERCIAL

## 2024-11-29 VITALS
SYSTOLIC BLOOD PRESSURE: 137 MMHG | OXYGEN SATURATION: 99 % | WEIGHT: 190.04 LBS | RESPIRATION RATE: 20 BRPM | TEMPERATURE: 98 F | HEIGHT: 67 IN | DIASTOLIC BLOOD PRESSURE: 86 MMHG | HEART RATE: 99 BPM

## 2024-11-29 DIAGNOSIS — Z90.5 ACQUIRED ABSENCE OF KIDNEY: Chronic | ICD-10-CM

## 2024-11-29 PROCEDURE — 99284 EMERGENCY DEPT VISIT MOD MDM: CPT

## 2024-11-29 NOTE — ED ADULT TRIAGE NOTE - CHIEF COMPLAINT QUOTE
increased SOB/difficulty breathing beginning today, hx asthma, states inhaler taken with minimal relief

## 2024-11-30 VITALS
RESPIRATION RATE: 20 BRPM | DIASTOLIC BLOOD PRESSURE: 75 MMHG | TEMPERATURE: 98 F | OXYGEN SATURATION: 96 % | SYSTOLIC BLOOD PRESSURE: 119 MMHG | HEART RATE: 95 BPM

## 2024-11-30 PROBLEM — J45.909 UNSPECIFIED ASTHMA, UNCOMPLICATED: Chronic | Status: ACTIVE | Noted: 2024-03-10

## 2024-11-30 PROCEDURE — 94640 AIRWAY INHALATION TREATMENT: CPT

## 2024-11-30 PROCEDURE — 99284 EMERGENCY DEPT VISIT MOD MDM: CPT | Mod: 25

## 2024-11-30 RX ORDER — IPRATROPIUM BROMIDE AND ALBUTEROL SULFATE 2.5; .5 MG/3ML; MG/3ML
3 SOLUTION RESPIRATORY (INHALATION) ONCE
Refills: 0 | Status: COMPLETED | OUTPATIENT
Start: 2024-11-30 | End: 2024-11-30

## 2024-11-30 RX ORDER — ALBUTEROL 90 MCG
4 AEROSOL (GRAM) INHALATION ONCE
Refills: 0 | Status: COMPLETED | OUTPATIENT
Start: 2024-11-30 | End: 2024-11-30

## 2024-11-30 RX ORDER — IPRATROPIUM BROMIDE AND ALBUTEROL SULFATE 2.5; .5 MG/3ML; MG/3ML
3 SOLUTION RESPIRATORY (INHALATION) EVERY 6 HOURS
Refills: 0 | Status: DISCONTINUED | OUTPATIENT
Start: 2024-11-30 | End: 2024-11-30

## 2024-11-30 RX ORDER — ALBUTEROL 90 MCG
4 AEROSOL (GRAM) INHALATION EVERY 4 HOURS
Refills: 0 | Status: DISCONTINUED | OUTPATIENT
Start: 2024-11-30 | End: 2024-11-30

## 2024-11-30 RX ORDER — ALBUTEROL 90 MCG
4 AEROSOL (GRAM) INHALATION ONCE
Refills: 0 | Status: COMPLETED | OUTPATIENT
Start: 2024-11-30 | End: 2025-10-29

## 2024-11-30 RX ORDER — PREDNISONE 20 MG/1
50 TABLET ORAL ONCE
Refills: 0 | Status: COMPLETED | OUTPATIENT
Start: 2024-11-30 | End: 2024-11-30

## 2024-11-30 RX ORDER — IPRATROPIUM BROMIDE AND ALBUTEROL SULFATE 2.5; .5 MG/3ML; MG/3ML
3 SOLUTION RESPIRATORY (INHALATION) EVERY 6 HOURS
Refills: 0 | Status: DISCONTINUED | OUTPATIENT
Start: 2024-11-30 | End: 2024-12-03

## 2024-11-30 RX ORDER — PREDNISONE 20 MG/1
1 TABLET ORAL
Qty: 5 | Refills: 1
Start: 2024-11-30 | End: 2024-12-09

## 2024-11-30 RX ORDER — ALBUTEROL 90 MCG
4 AEROSOL (GRAM) INHALATION EVERY 4 HOURS
Refills: 0 | Status: COMPLETED | OUTPATIENT
Start: 2024-11-30 | End: 2025-10-29

## 2024-11-30 RX ORDER — ALBUTEROL 90 MCG
2 AEROSOL (GRAM) INHALATION EVERY 4 HOURS
Refills: 0 | Status: DISCONTINUED | OUTPATIENT
Start: 2024-11-30 | End: 2024-11-30

## 2024-11-30 RX ADMIN — IPRATROPIUM BROMIDE AND ALBUTEROL SULFATE 3 MILLILITER(S): 2.5; .5 SOLUTION RESPIRATORY (INHALATION) at 01:46

## 2024-11-30 RX ADMIN — PREDNISONE 50 MILLIGRAM(S): 20 TABLET ORAL at 01:45

## 2024-11-30 RX ADMIN — Medication 4 PUFF(S): at 03:34

## 2024-11-30 NOTE — ED PROVIDER NOTE - DISCHARGE DATE
The patient is Stable - Low risk of patient condition declining or worsening    Shift Goals  Clinical Goals: pain, mobility, void  Patient Goals: comfort, mobility  Family Goals: na    Progress made toward(s) clinical / shift goals:    Problem: Knowledge Deficit - Standard  Goal: Patient and family/care givers will demonstrate understanding of plan of care, disease process/condition, diagnostic tests and medications  Outcome: Progressing     Problem: Pain - Standard  Goal: Alleviation of pain or a reduction in pain to the patient’s comfort goal  Outcome: Progressing     Problem: Fall Risk  Goal: Patient will remain free from falls  Outcome: Progressing     Problem: Mobility  Goal: Patient's capacity to carry out activities will improve  Outcome: Progressing     Problem: Wound/ / Incision Healing  Goal: Patient's wound/surgical incision will decrease in size and heals properly  Outcome: Progressing       Patient is not progressing towards the following goals:       30-Nov-2024

## 2024-11-30 NOTE — ED PROVIDER NOTE - CLINICAL SUMMARY MEDICAL DECISION MAKING FREE TEXT BOX
Pt is saturating at 98% on room air, slightly tachycardic<110, HD stable, speaking complete sentences, in no acute respiratory distress.  Low concern for pneumonia due to no infectious symptoms.  Low concern for PE, denies any calf tenderness, asymmetric leg swelling, hormonal use, recent long travel, recent immobilization.  Symptoms most likely due to asthma exacerbation.  Will administer DuoNebs x 3 with prednisone 50 mg and reassess.  Dispo; discharge home

## 2024-11-30 NOTE — ED PROVIDER NOTE - NSFOLLOWUPINSTRUCTIONS_ED_ALL_ED_FT
1. TAKE ALL MEDICATIONS AS DIRECTED.    2. FOR PAIN OR FEVER YOU CAN TAKE IBUPROFEN (MOTRIN, ADVIL) 600mg every 6 hours OR ACETAMINOPHEN (TYLENOL) 975mg every 6 hours AS NEEDED, AS DIRECTED ON PACKAGING.  3. FOLLOW UP WITH YOUR PRIMARY DOCTOR WITHIN 5 DAYS AS DIRECTED.  4. IF YOU HAD LABS OR IMAGING DONE, YOU WERE GIVEN COPIES OF ALL LABS AND/OR IMAGING RESULTS FROM YOUR ER VISIT--PLEASE TAKE THEM WITH YOU TO YOUR FOLLOW UP APPOINTMENTS.   To obtain a copy of your medical records or disc, please contact medical records during the hours of operation (Monday - Friday 8am - 7pm; Saturday 8am - 4pm) at Phone: [phone]   5. RETURN TO THE ER FOR ANY WORSENING SYMPTOMS OR CONCERNS.    Use the Albuterol inhaler as follows    4 puffs using spacer (4 breaths in and out in between each puff) every 4 hours for four days  ***********************  Then use Albuterol (4 puffs with 4 breaths in between each puff) as needed every 4-6 hours for shortness of breath/wheezing  Make sure to shake the inhaler prior to using      Asthma    Asthma is a condition in which the airways tighten and narrow, making it difficult to breath. Asthma episodes, also called asthma attacks, range from minor to life-threatening. Symptoms include wheezing, coughing, chest tightness, or shortness of breath. The diagnosis of asthma is made by a review of your medical history and a physical exam, but may involve additional testing. Asthma cannot be cured, but medicines and lifestyle changes can help control it. Avoid triggers of asthma which may include animal dander, pollen, mold, smoke, air pollutants, etc.     SEEK IMMEDIATE MEDICAL CARE IF YOU HAVE ANY OF THE FOLLOWING SYMPTOMS: worsening of symptoms, shortness of breath at rest, chest pain, bluish discoloration to lips or fingertips, lightheadedness/dizziness, or fever.

## 2024-11-30 NOTE — ED PROVIDER NOTE - PROGRESS NOTE DETAILS
Reassessment: pt feels markedly improved, decreased expiratory wheezing b/l. will prescribe steroids w/ refill, offered pulm referral however pt is moving next week to [place]. Pt understands to f/u with pulmonologist.

## 2024-11-30 NOTE — ED PROVIDER NOTE - PHYSICAL EXAMINATION
GENERAL: NAD  HEENT:  Atraumatic  CHEST/LUNG: Chest rise equal bilaterally, expiratory wheezing diffusely  HEART: Regular rate and rhythm  EXTREMITIES:  Extremities warm. No LE swelling, no calf tenderness  PSYCH: A&Ox3  SKIN: No obvious rashes or lesions

## 2024-11-30 NOTE — ED ADULT NURSE NOTE - OBJECTIVE STATEMENT
PT is a 28yo f coming from home c/o SOB. As per pt, has hx of asthma and has been attempting to take home meds with minimal relief, has been coughing and endorses chest discomfort while coughing. PMH of asthma. PT A,Ox4, ambulatory at baseline. Respirations even and unlabored, abd soft, nondistended and nontender, skin warm, dry and intact, VAUGHN. Denies HA, n/v/d, fever, chills and urinary symptoms. Stretcher locked in lowest position, appropriate side rails up for safety, pt instructed to call for RN if anything needed.

## 2024-11-30 NOTE — ED PROVIDER NOTE - ATTENDING CONTRIBUTION TO CARE
I, Sarmad Vang, performed a history and physical exam of the patient and discussed their management with the resident provider. I reviewed the resident provider's note and agree with the documented findings and plan of care. I was present and available for all procedures.    asthma exacerbation shortness of breath wheezing vs stable offered xr swab prefers med admin steroids nebs reassess for dispo Unlikely ACS PE pneumothorax dissection AAA pneumonia, Needs pulm follow-up but patient is moving so encourage patient to make appointment for pulmonologist in [place] where she will be residing    Well appearing and in NAD, head normal appearing atraumatic, trachea midline, no respiratory distress, lungs Expiratory wheezes bilaterally, rrr no murmurs, soft NT ND abdomen, no visible extremity deformities, Alert and oriented, non focal neuro exam, skin warm and dry, normal affect and mood, no leg swelling, calf ttp or jvd

## 2024-11-30 NOTE — ED PROVIDER NOTE - OBJECTIVE STATEMENT
29-year-old female patient past medical history asthma presents to ED for shortness of breath, chest tightness and what feels like her asthma exacerbation that started today.  Patient states that she used her albuterol inhaler multiple times throughout the day with no relief.  She used her nebulizer treatment at around 12 in the afternoon yesterday no relief.  Fevers, no chills, no body aches.  Patient states she has never needed intubation but has been on steroids for her asthma.

## 2024-11-30 NOTE — ED ADULT NURSE NOTE - NSFALLUNIVINTERV_ED_ALL_ED
Bed/Stretcher in lowest position, wheels locked, appropriate side rails in place/Call bell, personal items and telephone in reach/Instruct patient to call for assistance before getting out of bed/chair/stretcher/Non-slip footwear applied when patient is off stretcher/[place] to call system/Physically safe environment - no spills, clutter or unnecessary equipment/Purposeful proactive rounding/Room/bathroom lighting operational, light cord in reach

## 2024-11-30 NOTE — ED PROVIDER NOTE - PATIENT PORTAL LINK FT
You can access the FollowMyHealth Patient Portal offered by [facility] by registering at the following website: [email or web address]. By joining [employer]’s FollowMyHealth portal, you will also be able to view your health information using other applications (apps) compatible with our system.

## 2025-01-04 NOTE — ED PROVIDER NOTE - NS ED ATTENDING STATEMENT MOD
No (0)
I have personally provided the amount of critical care time documented below concurrently with the resident/fellow.  This time excludes time spent on separate procedures and time spent teaching. I have reviewed the resident’s / fellow’s documentation and I agree with the history, exam, and assessment and plan of care.

## 2025-03-26 NOTE — ED ADULT TRIAGE NOTE - BP NONINVASIVE SYSTOLIC (MM HG)
Discussed in rounds. Pt has transfer back agreement with [facility]. Dr. Anderson will contact transfer center.   131
